# Patient Record
Sex: MALE | Race: WHITE | Employment: FULL TIME | URBAN - METROPOLITAN AREA
[De-identification: names, ages, dates, MRNs, and addresses within clinical notes are randomized per-mention and may not be internally consistent; named-entity substitution may affect disease eponyms.]

---

## 2021-05-05 ENCOUNTER — HOSPITAL ENCOUNTER (OUTPATIENT)
Dept: GENERAL RADIOLOGY | Age: 56
Discharge: HOME | End: 2021-05-05
Attending: FAMILY MEDICINE
Payer: COMMERCIAL

## 2021-05-05 ENCOUNTER — OFFICE VISIT (OUTPATIENT)
Dept: FAMILY MEDICINE CLINIC | Facility: CLINIC | Age: 56
End: 2021-05-05

## 2021-05-05 VITALS
HEIGHT: 68 IN | BODY MASS INDEX: 25.61 KG/M2 | SYSTOLIC BLOOD PRESSURE: 122 MMHG | WEIGHT: 169 LBS | HEART RATE: 63 BPM | RESPIRATION RATE: 16 BRPM | DIASTOLIC BLOOD PRESSURE: 75 MMHG | TEMPERATURE: 98 F

## 2021-05-05 DIAGNOSIS — G89.29 TOE PAIN, CHRONIC, RIGHT: ICD-10-CM

## 2021-05-05 DIAGNOSIS — M79.674 TOE PAIN, CHRONIC, RIGHT: ICD-10-CM

## 2021-05-05 DIAGNOSIS — M25.474 SWELLING OF FIRST METATARSOPHALANGEAL (MTP) JOINT OF RIGHT FOOT: Primary | ICD-10-CM

## 2021-05-05 DIAGNOSIS — M25.474 SWELLING OF FIRST METATARSOPHALANGEAL (MTP) JOINT OF RIGHT FOOT: ICD-10-CM

## 2021-05-05 PROCEDURE — 73630 X-RAY EXAM OF FOOT: CPT | Performed by: FAMILY MEDICINE

## 2021-05-05 PROCEDURE — 3074F SYST BP LT 130 MM HG: CPT | Performed by: FAMILY MEDICINE

## 2021-05-05 PROCEDURE — 3008F BODY MASS INDEX DOCD: CPT | Performed by: FAMILY MEDICINE

## 2021-05-05 PROCEDURE — 3078F DIAST BP <80 MM HG: CPT | Performed by: FAMILY MEDICINE

## 2021-05-05 PROCEDURE — 99213 OFFICE O/P EST LOW 20 MIN: CPT | Performed by: FAMILY MEDICINE

## 2021-05-05 NOTE — PROGRESS NOTES
HPI:  60 yr old male who presents new to clinic. Wife comes to clinic. Used to see Dr. Zaria Alonso out of [facility]. . Pt reports pain in the right great toe. Has been hurting for the past 9 months.      PMHx: reviewed, see chart  PSHx: reviewed, see chart

## 2021-05-18 ENCOUNTER — OFFICE VISIT (OUTPATIENT)
Dept: PODIATRY CLINIC | Facility: CLINIC | Age: 56
End: 2021-05-18

## 2021-05-18 DIAGNOSIS — M20.21 HALLUX RIGIDUS OF RIGHT FOOT: Primary | ICD-10-CM

## 2021-05-18 PROCEDURE — 99243 OFF/OP CNSLTJ NEW/EST LOW 30: CPT | Performed by: PODIATRIST

## 2021-05-18 RX ORDER — IBUPROFEN 600 MG/1
600 TABLET ORAL EVERY 6 HOURS PRN
COMMUNITY
Start: 2021-04-20 | End: 2021-12-08 | Stop reason: WASHOUT

## 2021-05-18 NOTE — PROGRESS NOTES
HPI:    Patient ID: Ivonne Toussaint is a 60year old male. This 49-year-old male presents as a new patient to me on consult from [address] [facility]. Patient's chief complaint is pain and swelling of the right great toe.   Is been a problem for at least 9 months and i questions I believe that he is well-informed and indicates an understanding of my instructions. I welcomed further question and will follow-up when he decides on surgery.          ASSESSMENT/PLAN:   Hallux rigidus of right foot  (primary encounter diagnosi

## 2021-05-19 ENCOUNTER — TELEPHONE (OUTPATIENT)
Dept: FAMILY MEDICINE CLINIC | Facility: CLINIC | Age: 56
End: 2021-05-19

## 2021-05-19 DIAGNOSIS — M79.674 GREAT TOE PAIN, RIGHT: Primary | ICD-10-CM

## 2021-05-19 NOTE — TELEPHONE ENCOUNTER
Patient calling and states he send Dr. Faviola Moon yesterday and he don't agree with some of his diagnosis. He will like a second opinion and another Doctor to see and he is also asking can someone call him to discuss this .

## 2021-05-19 NOTE — TELEPHONE ENCOUNTER
Pt was called regarding results and he states he already saw Dr. Mary Duran 5/18/21, however, would like a 2nd opinion. Pt would like to speak with Dr. Guadalupe Szymanski regarding his visit with Dr. Mary Duran.

## 2021-05-20 NOTE — TELEPHONE ENCOUNTER
Managed Care-      Patient called and had questions about surgery. He would like a second opinion. He needs to see another podiatrist or a surgeon. Can you tell me where else he can go?      Can he use the new Podiatrist?     Thanks

## 2021-05-21 NOTE — TELEPHONE ENCOUNTER
Ghulam Mullins,    These are the podiatrists that are in [facility]' network.     Thank you  Alexa Washington

## 2021-06-15 ENCOUNTER — OFFICE VISIT (OUTPATIENT)
Dept: PODIATRY CLINIC | Facility: CLINIC | Age: 56
End: 2021-06-15

## 2021-06-15 DIAGNOSIS — M79.671 RIGHT FOOT PAIN: ICD-10-CM

## 2021-06-15 DIAGNOSIS — M19.071 ARTHRITIS OF RIGHT FOOT: ICD-10-CM

## 2021-06-15 DIAGNOSIS — M20.21 ACQUIRED HALLUX RIGIDUS OF RIGHT FOOT: Primary | ICD-10-CM

## 2021-06-15 PROCEDURE — 99214 OFFICE O/P EST MOD 30 MIN: CPT | Performed by: PODIATRIST

## 2021-06-15 NOTE — PROGRESS NOTES
[facility], [facility] Podiatry  Progress Note    Rochelle Gregory is a 60year old male.  Patient presents with:  Consult: R great toe, pain for over 6 months, xray in EPIC, 3/10 pain scale        HPI:     This is a pleasant male that presents to clinic today due to changes. No open lesions. No macerations, No Hyperkeratotic lesions. Nails are of normal thickness and appearance.   Vascular examination:   DP pulse is 2/4  PT pulse is 2/4  Capillary refill is immediate  Neurological examination:   Vibratory (128-Hz tunin orthotics    If pt elects for surgery recommend 1st MPJ fusion due to severity of the arthritis    RTC PRN to further discuss surgery. No follow-ups on file.     Sanjuanita Velasco DPM  6/15/2021

## 2021-06-22 ENCOUNTER — TELEPHONE (OUTPATIENT)
Dept: PODIATRY CLINIC | Facility: CLINIC | Age: 56
End: 2021-06-22

## 2021-06-22 NOTE — TELEPHONE ENCOUNTER
Patient has seen both Dr. Mary Duran and Dr. Vanessa Eng for surgical consultation. Neither has sent me a scheduling request.  Please advise.

## 2021-06-23 NOTE — TELEPHONE ENCOUNTER
LMTCB on pt's home # (only # listed) to call back to schedule appt with Meshulam for surgical consult appt. -- When pt calls back, please schedule appt with Meshulam. Thank you.

## 2021-06-30 ENCOUNTER — OFFICE VISIT (OUTPATIENT)
Dept: PODIATRY CLINIC | Facility: CLINIC | Age: 56
End: 2021-06-30

## 2021-06-30 ENCOUNTER — TELEPHONE (OUTPATIENT)
Dept: PODIATRY CLINIC | Facility: CLINIC | Age: 56
End: 2021-06-30

## 2021-06-30 DIAGNOSIS — M20.21 HALLUX RIGIDUS OF RIGHT FOOT: Primary | ICD-10-CM

## 2021-06-30 DIAGNOSIS — M20.21 ACQUIRED HALLUX RIGIDUS OF RIGHT FOOT: Primary | ICD-10-CM

## 2021-06-30 DIAGNOSIS — M79.671 RIGHT FOOT PAIN: ICD-10-CM

## 2021-06-30 PROCEDURE — 99214 OFFICE O/P EST MOD 30 MIN: CPT | Performed by: PODIATRIST

## 2021-06-30 NOTE — PROGRESS NOTES
[facility], [facility] Podiatry  Progress Note    Jamel Douglas is a 60year old male. Patient presents with: Follow - Up: Woud like to discuss surgical options. Denies currently any pain.          HPI:     This is a pleasant male that presents to clinic today due examination:   There are no varicosities. Skin appears moist, warm, and supple with positive hair growth. There are no color changes. No open lesions. No macerations, No Hyperkeratotic lesions. Nails are of normal thickness and appearance.   Vascular ex surgical procedures. The patient has failed all conservative treatment at this point.  The patient understands that if surgical procedures are performed, there are risks and complications that could occur, including but not limited to: hematoma formation, d course of treatment were answered to their understanding and satisfaction. RTO and follow up after surgery is necessary and expected and agreed to by the patient.  The patient acknowledged understanding of the above and agrees to follow all instructions and

## 2021-06-30 NOTE — TELEPHONE ENCOUNTER
Procedure: Right foot cheilectomy 1st MPJ  CPT code:   Length of Surgery: 60min  Any Instruments: power sagittal saw  Call patient: within 24 hours  Anesthesia: MAC  Location: [facility]  Assistance: none  Pacemaker: No  Anticoagulants: No  Nickel Allergy: No  L

## 2021-07-01 NOTE — TELEPHONE ENCOUNTER
Patient called back and requested surgery be scheduled for 8/5/21 which was done so this date at [facility]. Managed care order placed this date. Patient was told I would call him next week to review the details and schedule post op visit.

## 2021-07-06 ENCOUNTER — TELEPHONE (OUTPATIENT)
Dept: FAMILY MEDICINE CLINIC | Facility: CLINIC | Age: 56
End: 2021-07-06

## 2021-07-06 NOTE — TELEPHONE ENCOUNTER
Pt informed medical records received, but there is no Colonoscopy report. Pt stts he has the report and will fax it.

## 2021-07-12 NOTE — TELEPHONE ENCOUNTER
Called patient this date and let him know surgery is confirmed for 8/5/21 at 9:00 a.m. Reviewed pre-op instructions with patient and he voiced understanding. Also sent instructions to him thru My Chart this date. Scheduled first post op appt as well.   KIKE

## 2021-07-27 ENCOUNTER — TELEPHONE (OUTPATIENT)
Dept: PODIATRY CLINIC | Facility: CLINIC | Age: 56
End: 2021-07-27

## 2021-07-27 NOTE — TELEPHONE ENCOUNTER
[facility] faxed forms to [facility]  for Dr Lily Haney to complete.   Scanned to Forms, took orig to [place]

## 2021-08-03 NOTE — TELEPHONE ENCOUNTER
Pt is seeking continuous leave. 1st day off work 8/5/21 (sx) he is having Cheilectomy with dr Nemesio Vann of 10-15 business day turn around time.

## 2021-08-04 NOTE — TELEPHONE ENCOUNTER
Dr. Brandie Krishnan,     Please sign off on form: Fmla   -Highlight the patient and hit \"Chart\" button.   -In Chart Review, w/in the Encounter tab - click 1 time on the Telephone call encounter for 7/27/21 Scroll down the telephone encounter.  -Click \"scan on\"

## 2021-08-05 ENCOUNTER — PROCEDURE (OUTPATIENT)
Dept: SURGERY | Age: 56
End: 2021-08-05

## 2021-08-05 PROCEDURE — 28289 CORRJ HALUX RIGDUS W/O IMPLT: CPT | Performed by: PODIATRIST

## 2021-08-05 RX ORDER — HYDROCODONE BITARTRATE AND ACETAMINOPHEN 5; 325 MG/1; MG/1
1 TABLET ORAL EVERY 6 HOURS PRN
Qty: 20 TABLET | Refills: 0 | Status: SHIPPED | OUTPATIENT
Start: 2021-08-05 | End: 2021-08-10

## 2021-08-05 NOTE — PROCEDURES
OPERATIVE REPORT     Wilmer Maldonado Patient Status:  No patient class for patient encounter    1965 MRN [id number]   Location [address] [facility] Attending No att. providers found   Hosp Day # 0 PCP Georgette Matthews DO     Date of Yadav right ankle. Following IV sedation, local anesthesia was obtained about the right foot utilizing 20 cc's of a 1:1 mixture of 1% lidocaine plain and 0.5% marcaine plain. The foot was then scrubbed, prepped, and draped in the usual aseptic manner.   The pneu subsequently evaluated which did reveal adequate resection of the previous hypertrophic osseous proliferations.   Range of motion was assessed at this time and was noted to be improved along the first metatarsal phalangeal joint however there was still sign

## 2021-08-05 NOTE — TELEPHONE ENCOUNTER
Forms completed and faxed to [address] [place] [address] at [phone]. Sent [facility].

## 2021-08-05 NOTE — PROGRESS NOTES
OPERATIVE REPORT     Beata Fairly Patient Status:  No patient class for patient encounter    1965 MRN [id number]   Location [address] [facility] Attending No att. providers found   Hosp Day # 0 PCP Vineet Montano DO     Date of Yadav patient's right ankle. Following IV sedation, local anesthesia was obtained about the right foot utilizing 20 cc's of a 1:1 mixture of 1% lidocaine plain and 0.5% marcaine plain. The foot was then scrubbed, prepped, and draped in the usual aseptic manner. was subsequently evaluated which did reveal adequate resection of the previous hypertrophic osseous proliferations.   Range of motion was assessed at this time and was noted to be improved along the first metatarsal phalangeal joint however there was still

## 2021-08-06 ENCOUNTER — TELEPHONE (OUTPATIENT)
Dept: PODIATRY CLINIC | Facility: CLINIC | Age: 56
End: 2021-08-06

## 2021-08-11 ENCOUNTER — OFFICE VISIT (OUTPATIENT)
Dept: PODIATRY CLINIC | Facility: CLINIC | Age: 56
End: 2021-08-11

## 2021-08-11 DIAGNOSIS — Z98.890 POST-OPERATIVE STATE: Primary | ICD-10-CM

## 2021-08-11 PROCEDURE — 99024 POSTOP FOLLOW-UP VISIT: CPT | Performed by: PODIATRIST

## 2021-08-11 RX ORDER — IBUPROFEN 600 MG/1
600 TABLET ORAL EVERY 8 HOURS PRN
Qty: 60 TABLET | Refills: 0 | Status: SHIPPED | OUTPATIENT
Start: 2021-08-11 | End: 2021-08-25

## 2021-08-11 NOTE — PROGRESS NOTES
[facility], [facility] Podiatry  Progress Note    Mavis Guardado is a 60year old male.  Patient presents with:  Post-Op: 1st post op, sx 8/5, pain on day one but improving since, pain today 3/10 no meds, denies s/s of infection        HPI:     This is a pleasant ma time.  Integumentary examination:   There are no varicosities. Skin appears moist, warm, and supple with positive hair growth. Right dorsal medial 1st MPJ incision site is well coapted with sutures intact, no SOI.   Vascular examination:   DP pulse is

## 2021-08-18 ENCOUNTER — OFFICE VISIT (OUTPATIENT)
Dept: PODIATRY CLINIC | Facility: CLINIC | Age: 56
End: 2021-08-18

## 2021-08-18 ENCOUNTER — HOSPITAL ENCOUNTER (OUTPATIENT)
Dept: GENERAL RADIOLOGY | Age: 56
Discharge: HOME | End: 2021-08-18
Attending: PODIATRIST
Payer: COMMERCIAL

## 2021-08-18 VITALS — WEIGHT: 169 LBS | BODY MASS INDEX: 25.61 KG/M2 | HEIGHT: 68 IN

## 2021-08-18 DIAGNOSIS — Z98.890 POST-OPERATIVE STATE: ICD-10-CM

## 2021-08-18 DIAGNOSIS — Z98.890 POST-OPERATIVE STATE: Primary | ICD-10-CM

## 2021-08-18 PROCEDURE — 3008F BODY MASS INDEX DOCD: CPT | Performed by: PODIATRIST

## 2021-08-18 PROCEDURE — 99024 POSTOP FOLLOW-UP VISIT: CPT | Performed by: PODIATRIST

## 2021-08-18 PROCEDURE — 73630 X-RAY EXAM OF FOOT: CPT | Performed by: PODIATRIST

## 2021-08-18 NOTE — PROGRESS NOTES
[facility], [facility] Podiatry  Progress Note    Dilia Baxter is a 60year old male.  Patient presents with:  Post-Op: right foot suture removals, denies any pain,         HPI:     This is a pleasant male that presents to clinic today 2 week S/P right 1st MPJ braxton Constitutional:   Patient in no apparent distress. Well kept Of normal body habitus. Alert and oriented to person, place, and time. Integumentary examination:   There are no varicosities.    Skin appears moist, warm, and supple with positive hair growt

## 2021-08-25 ENCOUNTER — OFFICE VISIT (OUTPATIENT)
Dept: PODIATRY CLINIC | Facility: CLINIC | Age: 56
End: 2021-08-25

## 2021-08-25 DIAGNOSIS — M20.21 HALLUX RIGIDUS OF RIGHT FOOT: ICD-10-CM

## 2021-08-25 DIAGNOSIS — Z98.890 POST-OPERATIVE STATE: Primary | ICD-10-CM

## 2021-08-25 PROCEDURE — 99024 POSTOP FOLLOW-UP VISIT: CPT | Performed by: PODIATRIST

## 2021-08-26 ENCOUNTER — TELEPHONE (OUTPATIENT)
Dept: FAMILY MEDICINE CLINIC | Facility: CLINIC | Age: 56
End: 2021-08-26

## 2021-08-26 ENCOUNTER — TELEMEDICINE (OUTPATIENT)
Dept: FAMILY MEDICINE CLINIC | Facility: CLINIC | Age: 56
End: 2021-08-26

## 2021-08-26 DIAGNOSIS — Z12.11 SCREENING FOR COLON CANCER: ICD-10-CM

## 2021-08-26 DIAGNOSIS — J30.2 SEASONAL ALLERGIC RHINITIS, UNSPECIFIED TRIGGER: Primary | ICD-10-CM

## 2021-08-26 PROCEDURE — 99213 OFFICE O/P EST LOW 20 MIN: CPT | Performed by: FAMILY MEDICINE

## 2021-08-26 NOTE — PROGRESS NOTES
HPI: Mark Petty is a 60year old male who presents for sinus congestion. Pt has been taking Flonase. No fever or other COVID symptoms. No change in taste or smell. Has been going on for 3-4 weeks. Has post nasal drip. Worse in the morning. Denies HA.   Has s

## 2021-08-26 NOTE — TELEPHONE ENCOUNTER
Pt calling stating he was waiting in Telehealth lobby but never connected with Dr. Jeremiah Borges asking if she will still be speaking with him today. Requesting call back   Please advise.

## 2021-08-27 ENCOUNTER — PATIENT MESSAGE (OUTPATIENT)
Dept: FAMILY MEDICINE CLINIC | Facility: CLINIC | Age: 56
End: 2021-08-27

## 2021-08-27 ENCOUNTER — TELEPHONE (OUTPATIENT)
Dept: FAMILY MEDICINE CLINIC | Facility: CLINIC | Age: 56
End: 2021-08-27

## 2021-08-27 RX ORDER — CLINDAMYCIN HYDROCHLORIDE 300 MG/1
300 CAPSULE ORAL 3 TIMES DAILY
Qty: 21 CAPSULE | Refills: 0 | Status: SHIPPED | OUTPATIENT
Start: 2021-08-27 | End: 2021-09-03

## 2021-08-27 NOTE — TELEPHONE ENCOUNTER
From: Dianna Thompson  To: Jacob Novoa DO  Sent: 8/27/2021 10:30 AM CDT  Subject: Prescription Question    Good morning Dr. Orosco Presume I am reaching out to you this morning in hopes that what I’m asking for is a prescription for sinus infection I feel that wh

## 2021-08-27 NOTE — TELEPHONE ENCOUNTER
The patient stated the Zyrtec did not help and feels what he has is a sinus infection and is asking for an antibiotic. The patient stated his symptoms started 1 1/2 months ago. Denies a fever.   He also stated every morning he sneezes for 45 minutes a

## 2021-09-01 ENCOUNTER — OFFICE VISIT (OUTPATIENT)
Dept: PODIATRY CLINIC | Facility: CLINIC | Age: 56
End: 2021-09-01

## 2021-09-01 DIAGNOSIS — Z98.890 POST-OPERATIVE STATE: ICD-10-CM

## 2021-09-01 DIAGNOSIS — M20.5X1 HALLUX LIMITUS OF RIGHT FOOT: Primary | ICD-10-CM

## 2021-09-01 PROCEDURE — 99024 POSTOP FOLLOW-UP VISIT: CPT | Performed by: PODIATRIST

## 2021-09-01 NOTE — PROGRESS NOTES
[facility], [facility] Podiatry  Progress Note    Joe Priest is a 60year old male. Patient presents with:  Post-Op: 1 wk f/u s/p right 1st MPJ cheilectomy done on 8/5/21.  per patient foot doing well. states is ready to go back to work if ok per MD. Denies any visit. Constitutional:   Patient in no apparent distress. Well kept Of normal body habitus. Alert and oriented to person, place, and time. Integumentary examination:   There are no varicosities.    Skin appears moist, warm, and supple with positive hair

## 2021-09-03 ENCOUNTER — TELEPHONE (OUTPATIENT)
Dept: PODIATRY CLINIC | Facility: CLINIC | Age: 56
End: 2021-09-03

## 2021-09-07 ENCOUNTER — TELEPHONE (OUTPATIENT)
Dept: PHYSICAL THERAPY | Facility: HOSPITAL | Age: 56
End: 2021-09-07

## 2021-09-08 ENCOUNTER — TELEPHONE (OUTPATIENT)
Dept: PODIATRY CLINIC | Facility: CLINIC | Age: 56
End: 2021-09-08

## 2021-09-08 ENCOUNTER — TELEPHONE (OUTPATIENT)
Dept: PHYSICAL THERAPY | Facility: HOSPITAL | Age: 56
End: 2021-09-08

## 2021-09-08 NOTE — TELEPHONE ENCOUNTER
[facility] disab forms logged. [facility] consent on file. Sent Forms fee [facility] message.

## 2021-09-08 NOTE — TELEPHONE ENCOUNTER
[address] faxed Short Term Disability Forms to  [facility] for Dr Marion Chen to complete. Pt has not signed HIPPA or pd $25 fee for Disability forms. Scanned to [facility] and brought originals to [place] @ [facility].

## 2021-09-09 ENCOUNTER — APPOINTMENT (OUTPATIENT)
Dept: PHYSICAL THERAPY | Facility: HOSPITAL | Age: 56
End: 2021-09-09
Attending: PODIATRIST
Payer: COMMERCIAL

## 2021-09-09 ENCOUNTER — TELEPHONE (OUTPATIENT)
Dept: PHYSICAL THERAPY | Facility: HOSPITAL | Age: 56
End: 2021-09-09

## 2021-09-21 NOTE — TELEPHONE ENCOUNTER
[address] faxed Attending Physician Statement to  [facility]  for Dr Carlos Hernández to complete. Unsure if pt has signed HIPPA or pd forms fee. Scanned to [place] and brought original to [facility].

## 2021-09-21 NOTE — TELEPHONE ENCOUNTER
Dr. Kristina Valentin,     Please sign off on form: Disab   -Highlight the patient and hit \"Chart\" button.   -In Chart Review, w/in the Encounter tab - click 1 time on the Telephone call encounter for 7/27/21 Scroll down the telephone encounter.  -Click \"scan on\

## 2021-09-30 ENCOUNTER — APPOINTMENT (OUTPATIENT)
Dept: PHYSICAL THERAPY | Facility: HOSPITAL | Age: 56
End: 2021-09-30
Attending: FAMILY MEDICINE
Payer: COMMERCIAL

## 2021-09-30 ENCOUNTER — TELEPHONE (OUTPATIENT)
Dept: PHYSICAL THERAPY | Facility: HOSPITAL | Age: 56
End: 2021-09-30

## 2021-10-14 ENCOUNTER — OFFICE VISIT (OUTPATIENT)
Dept: PHYSICAL THERAPY | Facility: HOSPITAL | Age: 56
End: 2021-10-14
Payer: COMMERCIAL

## 2021-10-14 PROCEDURE — 97161 PT EVAL LOW COMPLEX 20 MIN: CPT

## 2021-10-14 PROCEDURE — 97140 MANUAL THERAPY 1/> REGIONS: CPT

## 2021-10-14 NOTE — PROGRESS NOTES
LOWER EXTREMITY EVALUATION:   Referring Physician: Dr. Glenn Villeda  Diagnosis: Hallux limitus of right foot (M20.5X1)  Post-operative state (Q50.631)       Date of Service: 10/14/2021     PATIENT SUMMARY   Emilie Joyce is a 60year old male who presents to the above ipairments and reach functional goals. Precautions:  None other than surgical   OBJECTIVE:   Observation:   Gait: pt ambulates on level ground with decreased push off on R LE.   Stairs: reciprocal   B LE Squat:  Raises heels with restricted gr min     Based on clinical rationale and outcome measures, this evaluation involved Low Complexity decision making. PLAN OF CARE:    Goals: (to be met in 8-10 visits)  Pt will improve R TCJ  DF by  5-10 deg  to promote normalized walking pattern.   Pt wi 10/14/2021  To:1/12/2022

## 2021-10-20 ENCOUNTER — OFFICE VISIT (OUTPATIENT)
Dept: PHYSICAL THERAPY | Facility: HOSPITAL | Age: 56
End: 2021-10-20
Payer: COMMERCIAL

## 2021-10-20 PROCEDURE — 97140 MANUAL THERAPY 1/> REGIONS: CPT

## 2021-10-20 PROCEDURE — 97110 THERAPEUTIC EXERCISES: CPT

## 2021-10-20 NOTE — PROGRESS NOTES
Dx: Hallux limitus of right foot (M20.5X1)  Post-operative state (Z98.890)         Insurance (Authorized # of Visits):  [employer] [facility] 2/12 auth until 12/3/2021           Authorizing Physician: Dr. Valerie Solis  Next MD visit: none scheduled  Fall Risk: standard ADL.  Pt will be indep with HEP for symptom management and recovery of function.     Plan: foot/ankle intrinsic and extrinsic training; mobility of joints through the foot/ankle complex ; lower quarter strength  Patient will be seen for 1-2 x/week or a tota

## 2021-10-25 ENCOUNTER — OFFICE VISIT (OUTPATIENT)
Dept: PHYSICAL THERAPY | Facility: HOSPITAL | Age: 56
End: 2021-10-25
Payer: COMMERCIAL

## 2021-10-25 PROCEDURE — 97140 MANUAL THERAPY 1/> REGIONS: CPT

## 2021-10-25 PROCEDURE — 97110 THERAPEUTIC EXERCISES: CPT

## 2021-10-25 NOTE — PROGRESS NOTES
Dx: Hallux limitus of right foot (M20.5X1)  Post-operative state (Z98.890)         Insurance (Authorized # of Visits):  Mohinder Robbins [address] [facility] 2/12 auth until 12/3/2021           Authorizing Physician: Dr. Cecillia Osgood Next MD visit: none scheduled  Fall Risk: standard lower quarter strengthening. Date: 10/20/2021  TX#: 2/8-10 per poc, 12 auth per HMO Date:  10/25/21              TX#: 3/12 per HMO Date:                 TX#: 4/12 per HMO Date:                 TX#: 5/12 per HMO Date:    Tx#: 6/12 per HMO   MT:   TC gapping

## 2021-10-27 ENCOUNTER — APPOINTMENT (OUTPATIENT)
Dept: PHYSICAL THERAPY | Facility: HOSPITAL | Age: 56
End: 2021-10-27
Payer: COMMERCIAL

## 2021-11-01 ENCOUNTER — OFFICE VISIT (OUTPATIENT)
Dept: PHYSICAL THERAPY | Facility: HOSPITAL | Age: 56
End: 2021-11-01
Payer: COMMERCIAL

## 2021-11-01 PROCEDURE — 97110 THERAPEUTIC EXERCISES: CPT

## 2021-11-01 PROCEDURE — 97140 MANUAL THERAPY 1/> REGIONS: CPT

## 2021-11-01 NOTE — PROGRESS NOTES
Dx: Hallux limitus of right foot (M20.5X1)  Post-operative state (Z98.890)         Insurance (Authorized # of Visits):  Baron Peto [facility] 2/12 auth until 12/3/2021           Authorizing Physician: Dr. Mitul Chao  Next MD visit: none scheduled  Fall Risk: standard quarter strengthening. Date: 10/20/2021  TX#: 2/8-10 per poc, 12 auth per HMO Date:  10/25/21              TX#: 3/12 per HMO Date:    11/1/121            TX#: 4/12 per HMO Date:                 TX#: 5/12 per HMO Date:    Tx#: 6/12 per HMO   MT:   WILLIAM stubbs extension  2x20  -Calf stretching @ wall 20 sec x3;          HEP:  10/20/21: self DF mobs for TCJ and MTP ray 1; calf stretching    Charges: TE x1; MT x2;        Total Timed Treatment: 45 min  Total Treatment Time: 46 min

## 2021-11-03 ENCOUNTER — APPOINTMENT (OUTPATIENT)
Dept: PHYSICAL THERAPY | Facility: HOSPITAL | Age: 56
End: 2021-11-03
Payer: COMMERCIAL

## 2021-11-08 ENCOUNTER — APPOINTMENT (OUTPATIENT)
Dept: PHYSICAL THERAPY | Facility: HOSPITAL | Age: 56
End: 2021-11-08
Payer: COMMERCIAL

## 2021-11-10 ENCOUNTER — OFFICE VISIT (OUTPATIENT)
Dept: PODIATRY CLINIC | Facility: CLINIC | Age: 56
End: 2021-11-10

## 2021-11-10 ENCOUNTER — APPOINTMENT (OUTPATIENT)
Dept: PHYSICAL THERAPY | Facility: HOSPITAL | Age: 56
End: 2021-11-10
Payer: COMMERCIAL

## 2021-11-10 VITALS — BODY MASS INDEX: 25.61 KG/M2 | HEIGHT: 68 IN | WEIGHT: 169 LBS

## 2021-11-10 DIAGNOSIS — M20.21 HALLUX RIGIDUS OF RIGHT FOOT: Primary | ICD-10-CM

## 2021-11-10 PROCEDURE — 99212 OFFICE O/P EST SF 10 MIN: CPT | Performed by: PODIATRIST

## 2021-11-10 PROCEDURE — 3008F BODY MASS INDEX DOCD: CPT | Performed by: PODIATRIST

## 2021-11-10 NOTE — PROGRESS NOTES
[facility], [facility] Podiatry  Progress Note    Supa Mckee is a 60year old male.  Patient presents with:  Post-Op:  SX on 8/5/21 chielectomy  right 1st MPJ ,patient states he has some sorness a 4/10         HPI:     This is a pleasant male that presents to  medial 1st MPJ incision site is well healed  Vascular examination:   DP pulse is 2/4  PT pulse is 2/4  Capillary refill is immediate    Mild edema to right forefoot  Neurological examination:   Vibratory (128-Hz tuning fork) sensation is present to right a

## 2021-11-11 ENCOUNTER — TELEPHONE (OUTPATIENT)
Dept: GASTROENTEROLOGY | Facility: CLINIC | Age: 56
End: 2021-11-11

## 2021-11-11 ENCOUNTER — OFFICE VISIT (OUTPATIENT)
Dept: GASTROENTEROLOGY | Facility: CLINIC | Age: 56
End: 2021-11-11

## 2021-11-11 VITALS
HEIGHT: 68 IN | WEIGHT: 178 LBS | HEART RATE: 65 BPM | SYSTOLIC BLOOD PRESSURE: 126 MMHG | BODY MASS INDEX: 26.98 KG/M2 | DIASTOLIC BLOOD PRESSURE: 70 MMHG

## 2021-11-11 DIAGNOSIS — Z12.11 SCREENING FOR COLON CANCER: ICD-10-CM

## 2021-11-11 DIAGNOSIS — Z12.11 COLON CANCER SCREENING: Primary | ICD-10-CM

## 2021-11-11 PROCEDURE — 3078F DIAST BP <80 MM HG: CPT | Performed by: INTERNAL MEDICINE

## 2021-11-11 PROCEDURE — 3074F SYST BP LT 130 MM HG: CPT | Performed by: INTERNAL MEDICINE

## 2021-11-11 PROCEDURE — 3008F BODY MASS INDEX DOCD: CPT | Performed by: INTERNAL MEDICINE

## 2021-11-11 PROCEDURE — 99243 OFF/OP CNSLTJ NEW/EST LOW 30: CPT | Performed by: INTERNAL MEDICINE

## 2021-11-11 RX ORDER — POLYETHYLENE GLYCOL 3350, SODIUM CHLORIDE, SODIUM BICARBONATE, POTASSIUM CHLORIDE 420; 11.2; 5.72; 1.48 G/4L; G/4L; G/4L; G/4L
POWDER, FOR SOLUTION ORAL
Qty: 1 EACH | Refills: 0 | Status: SHIPPED | OUTPATIENT
Start: 2021-11-11

## 2021-11-11 NOTE — H&P
[facility] [address] Gastroenterology                                                                                                  Clinic History and Physical     Pa Take by mouth. • ibuprofen 600 MG Oral Tab Take 600 mg by mouth every 6 (six) hours as needed.    (Patient not taking: No sig reported)         Allergies:    Penicillamine           HIVES  Penicillins             RASH    ROS:   CONSTITUTIONAL:  negative to proceed with colonoscopy. Plan:  1. Schedule colonoscopy with MAC [Diagnosis: screening]    2.  bowel prep from pharmacy (split GOlytely)    3. Continue all medications for procedure    4. Read all bowel prep instructions carefully    5.  AVOID

## 2021-11-11 NOTE — TELEPHONE ENCOUNTER
CC ck up meds refill    HPI 60 y/o male known gouty arthritis, hypertension, hyperlipidemia,& ED who  Is 3 month fu    Gouty arthritis mostly in ankles & feet & knees  Last attack was about 2 weeks ago    Hypertension  No chest pain, headache, sob, leg edema, stroke, kidney disease     Hyperlipidemia  No muscle aches or muscle weakness or cramps since last visit. ED  Able to obtain/maintain erection  Current treatment        Lab Results   Component Value Date    CHOL 170 06/28/2019    CHOL 264 (H) 04/02/2018    CHOL 242 (H) 01/27/2017     Lab Results   Component Value Date    TRIG 113 06/28/2019    TRIG 174 (H) 04/02/2018    TRIG 104 01/27/2017     Lab Results   Component Value Date    HDL 71 (H) 06/28/2019    HDL 68 (H) 04/02/2018    HDL 95 (H) 01/27/2017     Lab Results   Component Value Date    LDLCALC 76 06/28/2019    LDLCALC 161 (H) 04/02/2018    LDLCALC 126 (H) 01/27/2017     Lab Results   Component Value Date    LABVLDL 23 06/28/2019    LABVLDL 35 04/02/2018    LABVLDL 21 01/27/2017     No results found for: CHOLHDLRATIO      1. Chronic idiopathic gout involving toe, unspecified laterality  Last attack 2 weeks ago, ck Uric acid level  - allopurinol (ZYLOPRIM) 300 MG tablet; Take 1 tablet by mouth daily  Dispense: 90 tablet; Refill: 1  - colchicine (COLCRYS) 0.6 MG tablet; Take 1 tablet by mouth once daily  Dispense: 90 tablet; Refill: 0  - indomethacin (INDOCIN) 50 MG capsule; Take 1 capsule by mouth 2 times daily (with meals) As needed stop ibuprofen  Dispense: 180 capsule; Refill: 1    2. Essential hypertension  bp at goal  meds refill  Low salt diet  Labs  Patient is compliant with medications, denies side effects,provides adequate relief. No new symptoms or problem noted by patient today. Patient's problem is noted, no change  Since last visit. Will monitor labs as needed. Will refill as appropriate .  Patient has been counseled today and will continue current plans.    -
Scheduled for:  Colonoscopy 51003  Provider Name: Dr Todd Jaimes   Date:  Mon 1/10/2022  Location: [facility]   Sedation: MAC   Time: 11:15 am    Prep: golytely    Meds/Allergies Reconciled?: physician review  Diagnosis with codes: screening Z12.11   Was patient inform
bisoprolol-hydroCHLOROthiazide (ZIAC) 10-6.25 MG per tablet; TAKE ONE TABLET BY MOUTH TWICE A DAY  Dispense: 180 tablet; Refill: 1  - Lipid, Fasting; Future  - Basic Metabolic Panel; Future  - AST; Future    3. Pure hypercholesterolemia  No recent testing  On statin    Ck labs  Low fat diet  Exercise       - atorvastatin (LIPITOR) 80 MG tablet; TAKE 1 TABLET BY MOUTH ONE TIME A DAY  Dispense: 90 tablet; Refill: 1  - Lipid, Fasting; Future  - AST; Future    4. Erectile dysfunction, unspecified erectile dysfunction type  Helps with erection    Cont. - tadalafil (CIALIS) 5 MG tablet; Take 1 tablet by mouth daily  Dispense: 90 tablet; Refill: 1    5. Screen for colon cancer    - POCT Fecal Immunochemical Test (FIT); Future    6. Prostate cancer screening    - PSA screening;  Future
Face Mask

## 2021-11-11 NOTE — PATIENT INSTRUCTIONS
1. Schedule colonoscopy with MAC [Diagnosis: screening]    2.  bowel prep from pharmacy (split GOlytely)    3. Continue all medications for procedure    4. Read all bowel prep instructions carefully    5.  AVOID seeds, nuts, popcorn, raw fruits and v

## 2021-11-15 ENCOUNTER — APPOINTMENT (OUTPATIENT)
Dept: PHYSICAL THERAPY | Facility: HOSPITAL | Age: 56
End: 2021-11-15
Payer: COMMERCIAL

## 2021-11-17 ENCOUNTER — APPOINTMENT (OUTPATIENT)
Dept: PHYSICAL THERAPY | Facility: HOSPITAL | Age: 56
End: 2021-11-17
Payer: COMMERCIAL

## 2021-12-08 ENCOUNTER — OFFICE VISIT (OUTPATIENT)
Dept: PODIATRY CLINIC | Facility: CLINIC | Age: 56
End: 2021-12-08

## 2021-12-08 DIAGNOSIS — M19.071 ARTHRITIS OF RIGHT FOOT: ICD-10-CM

## 2021-12-08 DIAGNOSIS — M20.21 HALLUX RIGIDUS OF RIGHT FOOT: Primary | ICD-10-CM

## 2021-12-08 PROCEDURE — 99212 OFFICE O/P EST SF 10 MIN: CPT | Performed by: PODIATRIST

## 2021-12-08 RX ORDER — MELOXICAM 15 MG/1
15 TABLET ORAL DAILY
Qty: 30 TABLET | Refills: 1 | Status: SHIPPED | OUTPATIENT
Start: 2021-12-08 | End: 2022-02-06

## 2021-12-08 NOTE — PROGRESS NOTES
[facility], [facility] Podiatry  Progress Note    Dilia Baxter is a 60year old male.  Patient presents with:  Post-Op: soreness in the ball of the right foot ,denies pain at incision site         HPI:     This is a pleasant male that presents to clinic today S/P appears moist, warm, and supple with positive hair growth.      Right dorsal medial 1st MPJ incision site is well healed  Vascular examination:   DP pulse is 2/4  PT pulse is 2/4  Capillary refill is immediate    Mild edema to right forefoot  Neurological e

## 2022-01-07 ENCOUNTER — LAB REQUISITION (OUTPATIENT)
Dept: SURGERY | Age: 57
End: 2022-01-07
Payer: COMMERCIAL

## 2022-01-07 DIAGNOSIS — Z01.818 PREOP EXAMINATION: ICD-10-CM

## 2022-01-09 LAB — SARS-COV-2 RNA RESP QL NAA+PROBE: NOT DETECTED

## 2022-01-10 ENCOUNTER — LAB REQUISITION (OUTPATIENT)
Dept: SURGERY | Age: 57
End: 2022-01-10
Payer: COMMERCIAL

## 2022-01-10 ENCOUNTER — SURGERY CENTER DOCUMENTATION (OUTPATIENT)
Dept: SURGERY | Age: 57
End: 2022-01-10

## 2022-01-10 ENCOUNTER — TELEPHONE (OUTPATIENT)
Dept: SURGERY | Age: 57
End: 2022-01-10

## 2022-01-10 DIAGNOSIS — K63.5 POLYP OF DESCENDING COLON, UNSPECIFIED TYPE: ICD-10-CM

## 2022-01-10 DIAGNOSIS — Z12.11 SPECIAL SCREENING FOR MALIGNANT NEOPLASMS, COLON: ICD-10-CM

## 2022-01-10 PROCEDURE — 88305 TISSUE EXAM BY PATHOLOGIST: CPT | Performed by: INTERNAL MEDICINE

## 2022-01-10 PROCEDURE — 45385 COLONOSCOPY W/LESION REMOVAL: CPT | Performed by: INTERNAL MEDICINE

## 2022-01-10 NOTE — PROCEDURES
COLONOSCOPY REPORT    Nicole Horton     1965 Age 60year old   PCP Nina Doty DO Endoscopist Daniel Lyon MD     Date of procedure: 01/10/22    Location: [facility]    Procedure: Colonoscopy w/cold snar appeared normal.    4. A retroflexed view of the rectum revealed small internal hemorrhoids. 5. The colonic mucosa throughout the colon showed normal vascular pattern, without evidence of angioectasias or inflammation.      6. ROBERT: normal rectal tone, no

## 2022-01-10 NOTE — TELEPHONE ENCOUNTER
Patient left before I could talk to him about c-scope results. LEft detailed message for him re: findings. Asked him to call me if questions.

## 2022-01-11 ENCOUNTER — TELEPHONE (OUTPATIENT)
Dept: GASTROENTEROLOGY | Facility: CLINIC | Age: 57
End: 2022-01-11

## 2022-01-11 NOTE — TELEPHONE ENCOUNTER
Colby Feliciano to know that he did get the drs voicemail message. Pt. Wants the dr to know that everything went well an the staff was excellent to him.

## 2022-01-11 NOTE — TELEPHONE ENCOUNTER
Health maintenance updated.  Last colonoscopy done 1/10/22. 10 year recall placed into Pt Outreach, next due on 1/10/32 per Dr. Antonette Mata.    (recall placed in alternate TE 1/11/22)

## 2022-01-11 NOTE — TELEPHONE ENCOUNTER
Left message for patient as he did not , CLN polyp is hyperplastic polyp, repeat CLN in 10 years.     GI staff: please place recall in for colonoscopy in 10 years

## 2022-01-19 ENCOUNTER — TELEPHONE (OUTPATIENT)
Dept: FAMILY MEDICINE CLINIC | Facility: CLINIC | Age: 57
End: 2022-01-19

## 2022-01-19 ENCOUNTER — OFFICE VISIT (OUTPATIENT)
Dept: PODIATRY CLINIC | Facility: CLINIC | Age: 57
End: 2022-01-19
Payer: COMMERCIAL

## 2022-01-19 DIAGNOSIS — M20.21 HALLUX RIGIDUS OF RIGHT FOOT: Primary | ICD-10-CM

## 2022-01-19 DIAGNOSIS — M19.071 ARTHRITIS OF RIGHT FOOT: ICD-10-CM

## 2022-01-19 PROCEDURE — 99212 OFFICE O/P EST SF 10 MIN: CPT | Performed by: PODIATRIST

## 2022-01-19 NOTE — PROGRESS NOTES
[facility], [facility] Podiatry  Progress Note    Lenin Durant is a 60year old male. Patient presents with:   Toe Pain: s/p R big toe from 8/5/21 f/u - states he is ok, still has achiness in his toe         HPI:     This is a pleasant male that presents to clinic person, place, and time. Integumentary examination:   There are no varicosities. Skin appears moist, warm, and supple with positive hair growth.      Right dorsal medial 1st MPJ incision site is well healed  Vascular examination:   DP pulse is 2/4  PT pu

## 2022-03-29 ENCOUNTER — NURSE TRIAGE (OUTPATIENT)
Dept: FAMILY MEDICINE CLINIC | Facility: CLINIC | Age: 57
End: 2022-03-29

## 2022-04-11 ENCOUNTER — OFFICE VISIT (OUTPATIENT)
Dept: FAMILY MEDICINE CLINIC | Facility: CLINIC | Age: 57
End: 2022-04-11
Payer: COMMERCIAL

## 2022-04-11 VITALS
HEART RATE: 68 BPM | DIASTOLIC BLOOD PRESSURE: 78 MMHG | BODY MASS INDEX: 28.09 KG/M2 | WEIGHT: 179 LBS | OXYGEN SATURATION: 98 % | SYSTOLIC BLOOD PRESSURE: 121 MMHG | RESPIRATION RATE: 19 BRPM | HEIGHT: 67 IN

## 2022-04-11 DIAGNOSIS — H53.8 BLURRY VISION: Primary | ICD-10-CM

## 2022-04-11 DIAGNOSIS — R68.89 SENSITIVITY TO LIGHT: ICD-10-CM

## 2022-04-11 PROCEDURE — 3074F SYST BP LT 130 MM HG: CPT | Performed by: FAMILY MEDICINE

## 2022-04-11 PROCEDURE — 3078F DIAST BP <80 MM HG: CPT | Performed by: FAMILY MEDICINE

## 2022-04-11 PROCEDURE — 99213 OFFICE O/P EST LOW 20 MIN: CPT | Performed by: FAMILY MEDICINE

## 2022-04-11 PROCEDURE — 3008F BODY MASS INDEX DOCD: CPT | Performed by: FAMILY MEDICINE

## 2022-06-01 ENCOUNTER — OFFICE VISIT (OUTPATIENT)
Dept: FAMILY MEDICINE CLINIC | Facility: CLINIC | Age: 57
End: 2022-06-01
Payer: COMMERCIAL

## 2022-06-01 VITALS
DIASTOLIC BLOOD PRESSURE: 73 MMHG | HEART RATE: 53 BPM | WEIGHT: 170 LBS | TEMPERATURE: 99 F | HEIGHT: 67.5 IN | BODY MASS INDEX: 26.37 KG/M2 | SYSTOLIC BLOOD PRESSURE: 134 MMHG

## 2022-06-01 DIAGNOSIS — M79.674 GREAT TOE PAIN, RIGHT: ICD-10-CM

## 2022-06-01 DIAGNOSIS — Z00.00 ROUTINE PHYSICAL EXAMINATION: Primary | ICD-10-CM

## 2022-06-01 DIAGNOSIS — N52.9 ERECTILE DYSFUNCTION, UNSPECIFIED ERECTILE DYSFUNCTION TYPE: ICD-10-CM

## 2022-06-01 LAB
ALBUMIN SERPL-MCNC: 4.1 G/DL (ref 3.4–5)
ALBUMIN/GLOB SERPL: 1.2 {RATIO} (ref 1–2)
ALP LIVER SERPL-CCNC: 66 U/L
ALT SERPL-CCNC: 39 U/L
ANION GAP SERPL CALC-SCNC: 7 MMOL/L (ref 0–18)
AST SERPL-CCNC: 32 U/L (ref 15–37)
BILIRUB SERPL-MCNC: 0.5 MG/DL (ref 0.1–2)
BUN BLD-MCNC: 17 MG/DL (ref 7–18)
BUN/CREAT SERPL: 18.3 (ref 10–20)
CALCIUM BLD-MCNC: 8.8 MG/DL (ref 8.5–10.1)
CHLORIDE SERPL-SCNC: 105 MMOL/L (ref 98–112)
CHOLEST SERPL-MCNC: 178 MG/DL (ref ?–200)
CO2 SERPL-SCNC: 26 MMOL/L (ref 21–32)
COMPLEXED PSA SERPL-MCNC: 0.58 NG/ML (ref ?–4)
CREAT BLD-MCNC: 0.93 MG/DL
FASTING PATIENT LIPID ANSWER: NO
FASTING STATUS PATIENT QL REPORTED: NO
GLOBULIN PLAS-MCNC: 3.5 G/DL (ref 2.8–4.4)
GLUCOSE BLD-MCNC: 88 MG/DL (ref 70–99)
HDLC SERPL-MCNC: 42 MG/DL (ref 40–59)
LDLC SERPL CALC-MCNC: 102 MG/DL (ref ?–100)
NONHDLC SERPL-MCNC: 136 MG/DL (ref ?–130)
OSMOLALITY SERPL CALC.SUM OF ELEC: 287 MOSM/KG (ref 275–295)
POTASSIUM SERPL-SCNC: 3.6 MMOL/L (ref 3.5–5.1)
PROT SERPL-MCNC: 7.6 G/DL (ref 6.4–8.2)
SODIUM SERPL-SCNC: 138 MMOL/L (ref 136–145)
TRIGL SERPL-MCNC: 194 MG/DL (ref 30–149)
TSI SER-ACNC: 2.38 MIU/ML (ref 0.36–3.74)
VLDLC SERPL CALC-MCNC: 33 MG/DL (ref 0–30)

## 2022-06-01 PROCEDURE — 99396 PREV VISIT EST AGE 40-64: CPT | Performed by: FAMILY MEDICINE

## 2022-06-01 PROCEDURE — 90715 TDAP VACCINE 7 YRS/> IM: CPT | Performed by: FAMILY MEDICINE

## 2022-06-01 PROCEDURE — 3075F SYST BP GE 130 - 139MM HG: CPT | Performed by: FAMILY MEDICINE

## 2022-06-01 PROCEDURE — 3008F BODY MASS INDEX DOCD: CPT | Performed by: FAMILY MEDICINE

## 2022-06-01 PROCEDURE — 3078F DIAST BP <80 MM HG: CPT | Performed by: FAMILY MEDICINE

## 2022-06-01 PROCEDURE — 90471 IMMUNIZATION ADMIN: CPT | Performed by: FAMILY MEDICINE

## 2022-06-01 RX ORDER — SILDENAFIL 50 MG/1
50 TABLET, FILM COATED ORAL
Qty: 12 TABLET | Refills: 1 | Status: SHIPPED | OUTPATIENT
Start: 2022-06-01

## 2022-06-01 RX ORDER — MELOXICAM 15 MG/1
15 TABLET ORAL DAILY
COMMUNITY
Start: 2022-04-15

## 2022-07-28 ENCOUNTER — OFFICE VISIT (OUTPATIENT)
Dept: OPHTHALMOLOGY | Facility: CLINIC | Age: 57
End: 2022-07-28
Payer: COMMERCIAL

## 2022-07-28 DIAGNOSIS — H35.81 MACULAR EDEMA: Primary | ICD-10-CM

## 2022-07-28 DIAGNOSIS — H25.13 AGE-RELATED NUCLEAR CATARACT OF BOTH EYES: ICD-10-CM

## 2022-07-28 PROBLEM — H53.19 DISTORTED VISION: Status: ACTIVE | Noted: 2022-07-28

## 2022-07-28 PROCEDURE — 92004 COMPRE OPH EXAM NEW PT 1/>: CPT | Performed by: OPHTHALMOLOGY

## 2022-07-28 NOTE — ASSESSMENT & PLAN NOTE
Patient noticed \"bend\" in vision in his right eye in April of 2022, but he says it resolved after 2 weeks. Discussed with patient that he still has some edema/ swelling in his right macula. Refer to Dr. Supa Cash for evaluation and treatment. Appointment was scheduled on 8/11/22 at 3:30 pm  with Dr. Dorita Short. .   Location is the [address]- [address] [place] [address]. Phone # [phone] (orange parking lot)   Patient should bring photo ID, insurance cards, they should be aware that their eyes will be dilated and they should expect to be there for at least 2 hours. The patient should bring a list of all medications.

## 2022-07-28 NOTE — PATIENT INSTRUCTIONS
Macular edema- right eye  Patient noticed \"bend\" in vision in his right eye in April of 2022, but he says it resolved after 2 weeks. Discussed with patient that he still has some edema/ swelling in his right macula. Refer to Dr. Cary Lorenzo for evaluation and treatment. Appointment was scheduled on 8/11/22 at 3:30 pm  with Dr. Nicole Willis at    . Location is the [place] [address] location- [address] [place] [address]. Phone # [phone] (orange parking lot)   Patient should bring photo ID, insurance cards, they should be aware that their eyes will be dilated and they should expect to be there for at least 2 hours. The patient should bring a list of all medications. Age-related nuclear cataract of both eyes  Discussed very early cataracts in both eyes that are not affecting vision and are not surgical at this time.

## 2022-08-11 ENCOUNTER — TELEPHONE (OUTPATIENT)
Dept: OPHTHALMOLOGY | Facility: CLINIC | Age: 57
End: 2022-08-11

## 2022-08-11 NOTE — TELEPHONE ENCOUNTER
Patient states office does not see referral on file. Spoke to  and they are asking to have referral faxed to Fax: [phone]. Gave them the referral number and stated they will see patient but still need referral faxed. Patient in office now.  Please advise

## 2022-08-11 NOTE — TELEPHONE ENCOUNTER
Retina assistant Dr. Giovani Hobbs office states pt needs referral printed and sent to them please advise

## 2022-08-11 NOTE — TELEPHONE ENCOUNTER
Spoke to Dr Tyler Martins office. Let them know that I found the referral in system. I printed a copy of it and faxed it over to them.

## 2023-04-03 ENCOUNTER — NURSE TRIAGE (OUTPATIENT)
Dept: FAMILY MEDICINE CLINIC | Facility: CLINIC | Age: 58
End: 2023-04-03

## 2023-04-03 NOTE — TELEPHONE ENCOUNTER
Advised patient of Dr Vonnie Libman  note. Patient verbalized understanding and had no further questions.  Scheduled for 5 pm on 4/5

## 2023-04-05 ENCOUNTER — TELEPHONE (OUTPATIENT)
Dept: FAMILY MEDICINE CLINIC | Facility: CLINIC | Age: 58
End: 2023-04-05

## 2023-04-05 ENCOUNTER — OFFICE VISIT (OUTPATIENT)
Dept: FAMILY MEDICINE CLINIC | Facility: CLINIC | Age: 58
End: 2023-04-05

## 2023-04-05 VITALS
HEART RATE: 56 BPM | RESPIRATION RATE: 16 BRPM | SYSTOLIC BLOOD PRESSURE: 123 MMHG | DIASTOLIC BLOOD PRESSURE: 72 MMHG | WEIGHT: 168 LBS | BODY MASS INDEX: 26 KG/M2 | TEMPERATURE: 98 F

## 2023-04-05 DIAGNOSIS — R10.32 LLQ PAIN: Primary | ICD-10-CM

## 2023-04-05 LAB
APPEARANCE: CLEAR
BILIRUBIN: NEGATIVE
GLUCOSE (URINE DIPSTICK): NEGATIVE MG/DL
KETONES (URINE DIPSTICK): NEGATIVE MG/DL
LEUKOCYTES: NEGATIVE
MULTISTIX LOT#: NORMAL NUMERIC
NITRITE, URINE: NEGATIVE
OCCULT BLOOD: NEGATIVE
PH, URINE: 5 (ref 4.5–8)
PROTEIN (URINE DIPSTICK): NEGATIVE MG/DL
SPECIFIC GRAVITY: 1.02 (ref 1–1.03)
URINE-COLOR: YELLOW
UROBILINOGEN,SEMI-QN: 0.2 MG/DL (ref 0–1.9)

## 2023-04-05 PROCEDURE — 3074F SYST BP LT 130 MM HG: CPT | Performed by: FAMILY MEDICINE

## 2023-04-05 PROCEDURE — 3078F DIAST BP <80 MM HG: CPT | Performed by: FAMILY MEDICINE

## 2023-04-05 PROCEDURE — 99214 OFFICE O/P EST MOD 30 MIN: CPT | Performed by: FAMILY MEDICINE

## 2023-04-05 PROCEDURE — 81003 URINALYSIS AUTO W/O SCOPE: CPT | Performed by: FAMILY MEDICINE

## 2023-04-05 NOTE — TELEPHONE ENCOUNTER
Pt was seen in the ER last night. Needs to have CT scan done. Ordered STAT. Can you call insurance for approval and then schedule through hospital? Can you call and let him know information then?   Thanks

## 2023-04-06 NOTE — TELEPHONE ENCOUNTER
Called patient (name and  verified) and advised to go to the ED with any worsening pain/symptoms. Patient verbalized understanding and agrees with plan.

## 2023-04-06 NOTE — TELEPHONE ENCOUNTER
Dr. Evon Penaloza, approval for CT scan obtained. Patient states that he wants to wait until tomorrow to have the CT. Please advise.

## 2023-04-06 NOTE — TELEPHONE ENCOUNTER
Patient called back. Explained STAT order for CT scan.     (patient was seen in office yesterday)  Should be done today; patient states he is unable to due to scheduling conflict with kids. Explained if he has abd pain should be done today. He was given central scheduling ph# as he wanted to schedule for Friday. Otherwise, if he is able to do today, CALL office back and we can get him scheduled today.

## 2023-04-07 ENCOUNTER — HOSPITAL ENCOUNTER (OUTPATIENT)
Dept: CT IMAGING | Facility: HOSPITAL | Age: 58
Discharge: HOME | End: 2023-04-07
Attending: FAMILY MEDICINE
Payer: COMMERCIAL

## 2023-04-07 DIAGNOSIS — R10.32 LLQ PAIN: ICD-10-CM

## 2023-04-07 PROCEDURE — 74176 CT ABD & PELVIS W/O CONTRAST: CPT | Performed by: FAMILY MEDICINE

## 2023-04-12 ENCOUNTER — TELEPHONE (OUTPATIENT)
Dept: FAMILY MEDICINE CLINIC | Facility: CLINIC | Age: 58
End: 2023-04-12

## 2023-04-12 NOTE — TELEPHONE ENCOUNTER
Patient calling regarding CT Abdomen results. Advised below. He verbalized understanding. Test results now post immediately to your [facility] account. However, your doctor may not have the chance to review or provide comments on them before the results reach you. Our providers review and comment on all test results, normal or otherwise. If you have not heard from our office with comments on your test results within the next 3-4 days, please contact us again on this message string so we can assist you.

## 2023-04-24 ENCOUNTER — HOSPITAL ENCOUNTER (OUTPATIENT)
Dept: GENERAL RADIOLOGY | Facility: HOSPITAL | Age: 58
Discharge: HOME | End: 2023-04-24
Attending: PODIATRIST
Payer: COMMERCIAL

## 2023-04-24 ENCOUNTER — TELEPHONE (OUTPATIENT)
Dept: FAMILY MEDICINE CLINIC | Facility: CLINIC | Age: 58
End: 2023-04-24

## 2023-04-24 ENCOUNTER — OFFICE VISIT (OUTPATIENT)
Dept: PODIATRY CLINIC | Facility: CLINIC | Age: 58
End: 2023-04-24

## 2023-04-24 DIAGNOSIS — M79.671 RIGHT FOOT PAIN: ICD-10-CM

## 2023-04-24 DIAGNOSIS — M79.671 RIGHT FOOT PAIN: Primary | ICD-10-CM

## 2023-04-24 DIAGNOSIS — M77.8 CAPSULITIS OF FOOT, RIGHT: ICD-10-CM

## 2023-04-24 DIAGNOSIS — M20.21 HALLUX RIGIDUS OF RIGHT FOOT: Primary | ICD-10-CM

## 2023-04-24 PROCEDURE — 73630 X-RAY EXAM OF FOOT: CPT | Performed by: PODIATRIST

## 2023-04-24 RX ORDER — MELOXICAM 7.5 MG/1
7.5 TABLET ORAL DAILY PRN
Qty: 30 TABLET | Refills: 0 | Status: SHIPPED | OUTPATIENT
Start: 2023-04-24 | End: 2023-05-24

## 2023-04-24 NOTE — TELEPHONE ENCOUNTER
Patient calling ( identified name and  ) needs a referral for Podiatry  ( last one  )     Has an appointment with Dr. Aime Quiroz  today       Referral pended for your review, completion and approval.      Thank you

## 2023-05-03 NOTE — TELEPHONE ENCOUNTER
Pt is returning the nurses call. Bilobed Flap Text: The defect edges were debeveled with a #15 scalpel blade.  Given the location of the defect and the proximity to free margins a bilobe flap was deemed most appropriate.  Using a sterile surgical marker, an appropriate bilobed flap drawn around the defect.    The area thus outlined was incised deep to adipose tissue with a #15 scalpel blade and carried over to the primary defect.  The skin margins were undermined to an appropriate distance in all directions utilizing iris scissors.

## 2023-05-24 ENCOUNTER — OFFICE VISIT (OUTPATIENT)
Dept: PODIATRY CLINIC | Facility: CLINIC | Age: 58
End: 2023-05-24

## 2023-05-24 DIAGNOSIS — M19.071 ARTHRITIS OF RIGHT FOOT: Primary | ICD-10-CM

## 2023-05-24 PROCEDURE — 99213 OFFICE O/P EST LOW 20 MIN: CPT | Performed by: PODIATRIST

## 2023-05-24 RX ORDER — MELOXICAM 7.5 MG/1
7.5 TABLET ORAL DAILY PRN
Qty: 30 TABLET | Refills: 0 | Status: SHIPPED | OUTPATIENT
Start: 2023-05-24 | End: 2023-06-23

## 2023-06-01 RX ORDER — MELOXICAM 7.5 MG/1
TABLET ORAL
Qty: 30 TABLET | Refills: 0 | OUTPATIENT
Start: 2023-06-01

## 2023-10-16 ENCOUNTER — OFFICE VISIT (OUTPATIENT)
Dept: PODIATRY CLINIC | Facility: CLINIC | Age: 58
End: 2023-10-16
Payer: COMMERCIAL

## 2023-10-16 DIAGNOSIS — L84 FOOT CALLUS: Primary | ICD-10-CM

## 2023-10-16 DIAGNOSIS — M21.622 TAILOR'S BUNION OF LEFT FOOT: ICD-10-CM

## 2023-10-16 DIAGNOSIS — M79.672 LEFT FOOT PAIN: ICD-10-CM

## 2023-10-16 PROCEDURE — 99213 OFFICE O/P EST LOW 20 MIN: CPT | Performed by: PODIATRIST

## 2023-10-16 NOTE — PROGRESS NOTES
[facility], [facility] Podiatry  Progress Note    Millard Severin is a 59year old male. Patient presents with: Foot Pain: R foot f/u-  also L foot check for callus- rates pain 5/10 worse when putting pressure on it        HPI:     This is a pleasant male with PMH right 1st MPJ cheilectomy on 21. He presents to clinic today due to Left plantar foot callus pain. He states it has been present for about 6 months. He has tried removing it at home but it just grew back. Allergies: Penicillamine and Penicillins   Current Outpatient Medications   Medication Sig Dispense Refill    Sildenafil Citrate (VIAGRA) 50 MG Oral Tab Take 1 tablet (50 mg total) by mouth daily as needed for Erectile Dysfunction. (Patient not taking: Reported on 2023) 12 tablet 1    Multiple Vitamins-Minerals (CENTRUM SILVER 50+MEN OR) Take by mouth. Past Medical History:   Diagnosis Date    Central serous chorioretinopathy of eye, right 2022    Seen by Dr. Clarita Tsai edema- right eye 2022    Screen for colon cancer     repeat CLN in 10 years      Past Surgical History:   Procedure Laterality Date    COLONOSCOPY  2014    OTHER SURGICAL HISTORY      bilateral knee- meniscus repair      Family History   Problem Relation Age of Onset    Heart Disorder Mother     Heart Disorder Maternal Grandmother     Diabetes Neg     Glaucoma Neg     Macular degeneration Neg       Social History    Socioeconomic History      Marital status:     Tobacco Use      Smoking status: Former        Years: 2        Types: Cigarettes        Quit date: 3/5/2021        Years since quittin.6      Smokeless tobacco: Never      Tobacco comments: occasionally    Vaping Use      Vaping Use: Never used    Substance and Sexual Activity      Alcohol use:  Yes        Alcohol/week: 12.0 standard drinks of alcohol        Types: 12 Cans of beer per week        Comment: Socially      Drug use: Never          REVIEW OF SYSTEMS:   Denies Cardiology nausea, fever, chills  No calf pain  Denies chest pain or shortness of breath. EXAM:   There were no vitals taken for this visit. Constitutional:   Patient in no apparent distress. Well kept Of normal body habitus. Alert and oriented to person, place, and time. Integumentary examination:   There are no varicosities. Skin appears moist, warm, and supple with positive hair growth. Left sub 5th met head callus    Vascular examination:   DP pulse is 2/4  PT pulse is 2/4  Capillary refill is immediate    Neurological examination:   Vibratory (128-Hz tuning fork) sensation is present to right and is present to left. Sharp/dull is present to right and is present to left. Musculoskeletal examination:  Muscle Strength is 5/5. Right hallux severely decreased DF at MPJ with no pain      Left tailor bunion with POP to left sub 5th met head       LABS & IMAGING:     Lab Results   Component Value Date    GLU 88 06/01/2022    BUN 17 06/01/2022    CREATSERUM 0.93 06/01/2022    BUNCREA 18.3 06/01/2022    ANIONGAP 7 06/01/2022    GFRAA 104 06/01/2022    GFRNAA 90 06/01/2022    CA 8.8 06/01/2022     06/01/2022    K 3.6 06/01/2022     06/01/2022    CO2 26.0 06/01/2022    OSMOCALC 287 06/01/2022        No results found for: \"EAG\", \"A1C\"     No results found. ASSESSMENT AND PLAN:   Diagnoses and all orders for this visit:    Foot callus    Tailor's bunion of left foot    Left foot pain          Plan:       Discussed the etiology of this condition as well as both conservative and surgical treatment options. Discussed conservative measures to include wide shoes, extra depth shoes, padding, offloading, orthotics  Evaluated patient. Discussed treatment options with patient. Discussed proper hygiene and care for feet as well as use of emollient creams (ie Urea based creams)  Answered all patient questions.  Discussed offloading hyperkeratotic lesions with proper shoe gear, offloading pads, and insoles. Procedures: (CPT 22385 Paring or cutting of benign hyperkeratotic lesion)  One lesion pared utilizing #15 blade left foot without incident. Explained to pt that the left sub 5th met head callus is due to his tailor bunion  He is not interested in surgery at this time. Pt will check orthotic coverage, managed care order placed    RTC 1 month for possible orthotic casting and will add left sub 5th met head accomodation. No follow-ups on file.     Slime Coles DPM  10/16/23 Pulmonology Heme/Onc

## 2023-10-31 ENCOUNTER — PATIENT MESSAGE (OUTPATIENT)
Dept: PODIATRY CLINIC | Facility: CLINIC | Age: 58
End: 2023-10-31

## 2023-10-31 NOTE — TELEPHONE ENCOUNTER
From: Taty Lemus  To: Arden Lyon  Sent: 10/31/2023 11:16 AM CDT  Subject: Meloxicam     I would like to have a prescription refill if I may please thank you

## 2023-10-31 NOTE — TELEPHONE ENCOUNTER
Patient seen on 10/16/23 patient requested refill of meloxicam.     Meloxicam 7.5mg  Last rx: 5/24/23, #30, 0 refills  LOV: 5/16/23    Please advise on refill

## 2023-11-01 RX ORDER — MELOXICAM 7.5 MG/1
7.5 TABLET ORAL
Qty: 30 TABLET | Refills: 0 | OUTPATIENT
Start: 2023-11-01

## 2023-11-01 RX ORDER — MELOXICAM 7.5 MG/1
7.5 TABLET ORAL DAILY PRN
Qty: 30 TABLET | Refills: 0 | Status: SHIPPED | OUTPATIENT
Start: 2023-11-01 | End: 2023-12-01

## 2023-12-08 RX ORDER — MELOXICAM 7.5 MG/1
7.5 TABLET ORAL
Qty: 30 TABLET | Refills: 0 | Status: SHIPPED | OUTPATIENT
Start: 2023-12-08

## 2023-12-08 NOTE — TELEPHONE ENCOUNTER
Rx request please review and sign off if appropriate. Thank you. Last seen: 10/16/23  Last refill: 11/1/23 # 30 with 0 refills.

## 2024-01-09 ENCOUNTER — NURSE TRIAGE (OUTPATIENT)
Dept: FAMILY MEDICINE CLINIC | Facility: CLINIC | Age: 59
End: 2024-01-09

## 2024-01-09 NOTE — TELEPHONE ENCOUNTER
Please reply to pool: EM RN TRIAGE  Action Requested: Summary for Provider     []  Critical Lab, Recommendations Needed  [] Need Additional Advice  [x]   FYI    []   Need Orders  [] Need Medications Sent to Pharmacy  []  Other     SUMMARY: Patient contacts clinic reporting eye redness with scant crusting that began yesterday.  Recent viral illness that has resolved.  Denies periorbital swelling, vision loss or pain in the eye.  Denies fever, body aches or chills.  Using warm compress and saline eye drops.  RN advised continued home care.  Report back if symptoms not improving.  He verbalized understanding and compliance.    Reason for call: Eye Problem  Onset: Data Unavailable                       Reason for Disposition   Very small amount of discharge and only in corner of eye    Protocols used: Eye - Pus or [id number]

## 2024-01-10 NOTE — TELEPHONE ENCOUNTER
Patient calling back today with update, still same symptoms. Doing warm compresses, saline washes to eyes. No redness in lids, just eyes them selves are red. No pain, no swelling.     Patient scheduled tomorrow if not improving for evaluation, patient agreeable.     Will continue home care advice given. Warnings signs to call us back with if symptoms develop prior to visit.   Patient states understanding and agrees to plan.

## 2024-01-11 ENCOUNTER — OFFICE VISIT (OUTPATIENT)
Dept: FAMILY MEDICINE CLINIC | Facility: CLINIC | Age: 59
End: 2024-01-11
Payer: COMMERCIAL

## 2024-01-11 VITALS
RESPIRATION RATE: 16 BRPM | DIASTOLIC BLOOD PRESSURE: 73 MMHG | TEMPERATURE: 98 F | HEART RATE: 65 BPM | WEIGHT: 174 LBS | BODY MASS INDEX: 27 KG/M2 | SYSTOLIC BLOOD PRESSURE: 138 MMHG

## 2024-01-11 DIAGNOSIS — H57.89 EYE DRAINAGE: Primary | ICD-10-CM

## 2024-01-11 PROCEDURE — 3075F SYST BP GE 130 - 139MM HG: CPT | Performed by: FAMILY MEDICINE

## 2024-01-11 PROCEDURE — 3078F DIAST BP <80 MM HG: CPT | Performed by: FAMILY MEDICINE

## 2024-01-11 PROCEDURE — 99213 OFFICE O/P EST LOW 20 MIN: CPT | Performed by: FAMILY MEDICINE

## 2024-01-11 NOTE — PROGRESS NOTES
HPI: Abdulkadir is a 58 year old male who presents for bilateral eye drainage.  Woke up with eyes completely crusted. Happened on Tuesday as well.  Using warm compresses and washing it out with saline. Eyes are irritated. Drainage has gotten better. Had a cold around [place] and has sinus congestion.     PMH:    Past Medical History:   Diagnosis Date    Central serous chorioretinopathy of eye, right 08/11/2022    Seen by Dr. Potter    Macular edema- right eye 7/28/2022    Screen for colon cancer 2022    repeat CLN in 10 years      Alg:  Penicillamine and Penicillins   Meds:   Current Outpatient Medications on File Prior to Visit   Medication Sig Dispense Refill    Meloxicam 7.5 MG Oral Tab Take 1 tablet (7.5 mg total) by mouth daily as needed for Pain. 30 tablet 0    Multiple Vitamins-Minerals (CENTRUM SILVER 50+MEN OR) Take by mouth.       No current facility-administered medications on file prior to visit.      Tobacco Use: no    ROS: see HPI    Objective:   Gen: AOx3. NAD.  /73   Pulse 65   Temp 98.2 °F (36.8 °C) (Temporal)   Resp 16   Wt 174 lb (78.9 kg)   BMI 26.85 kg/m²   Eyes: Bilateral conjunctiva- mild erythema noted.  PERRLA. EOMI.  No drainage noted  HEENT:  Robby ear canals clear.  Robby TMs intact with good landmarks noted.  Nares patent.  Oral mucous membrane moist.  Normal lips, teeth, and gums.  Oropharynx normal.  Neck supple.  Good ROM.  No LAD.   CV:  Regular rate and rhythm; no murmurs  Lungs:  Clear to ausculation; good aeration               No wheezes, rales or rhonchi        Assessment:/Plan:  Encounter Diagnosis   Name Primary?    Eye drainage Yes       Over 50% improved since Monday. Likely due to congestion versus viral conjunctivitis. Advised to continue warm compresses and saline as needed.  May use anti-histamine and Flonase for congestion. Call if drainage and redness is worsening.  Will send antibiotic drop to pharmacy.     Colleen Weiler, DO

## 2024-01-17 NOTE — TELEPHONE ENCOUNTER
Last visit 10/16/23  Last rx given 12/8/23 #30 no refill  No scheduled f/u appt.  Do you approve refill?

## 2024-01-18 RX ORDER — MELOXICAM 7.5 MG/1
7.5 TABLET ORAL
Qty: 30 TABLET | Refills: 0 | OUTPATIENT
Start: 2024-01-18

## 2024-03-04 ENCOUNTER — OFFICE VISIT (OUTPATIENT)
Dept: PODIATRY CLINIC | Facility: CLINIC | Age: 59
End: 2024-03-04

## 2024-03-04 ENCOUNTER — HOSPITAL ENCOUNTER (OUTPATIENT)
Dept: GENERAL RADIOLOGY | Facility: HOSPITAL | Age: 59
Discharge: HOME | End: 2024-03-04
Attending: PODIATRIST
Payer: COMMERCIAL

## 2024-03-04 DIAGNOSIS — M79.671 RIGHT FOOT PAIN: ICD-10-CM

## 2024-03-04 DIAGNOSIS — M19.071 ARTHRITIS OF FIRST METATARSOPHALANGEAL (MTP) JOINT OF RIGHT FOOT: ICD-10-CM

## 2024-03-04 DIAGNOSIS — M19.071 ARTHRITIS OF FIRST METATARSOPHALANGEAL (MTP) JOINT OF RIGHT FOOT: Primary | ICD-10-CM

## 2024-03-04 PROCEDURE — 99213 OFFICE O/P EST LOW 20 MIN: CPT | Performed by: PODIATRIST

## 2024-03-04 PROCEDURE — 73630 X-RAY EXAM OF FOOT: CPT | Performed by: PODIATRIST

## 2024-03-04 RX ORDER — MELOXICAM 7.5 MG/1
7.5 TABLET ORAL
Qty: 30 TABLET | Refills: 1 | Status: SHIPPED | OUTPATIENT
Start: 2024-03-04 | End: 2024-05-03

## 2024-03-04 NOTE — PROGRESS NOTES
[facility] Podiatry  Progress Note    Abdulkadir Butts is a 58 year old male.   Chief Complaint   Patient presents with    Foot Pain     R foot f/u - Pt is having pain where he had sx, onset  weeks ago. Pt state he feels a knot under his arch. On and off tingling.  No swelling          HPI:     This is a pleasant male with PMH right 1st MPJ cheilectomy on 8/5/21.     He presents to clinic today due to right great toe joint pain.  He denies any injury but did have pain a few weeks ago.  He states the pain has almost resolved but still mild.        Allergies: Penicillamine and Penicillins   Current Outpatient Medications   Medication Sig Dispense Refill    Meloxicam 7.5 MG Oral Tab Take 1 tablet (7.5 mg total) by mouth daily as needed for Pain. 30 tablet 0    Multiple Vitamins-Minerals (CENTRUM SILVER 50+MEN OR) Take by mouth.        Past Medical History:   Diagnosis Date    Central serous chorioretinopathy of eye, right 08/11/2022    Seen by Dr. Potter    Macular edema- right eye 7/28/2022    Screen for colon cancer 2022    repeat CLN in 10 years      Past Surgical History:   Procedure Laterality Date    COLONOSCOPY  02/05/2014    OTHER SURGICAL HISTORY      bilateral knee- meniscus repair      Family History   Problem Relation Age of Onset    Heart Disorder Mother     Heart Disorder Maternal Grandmother     Diabetes Neg     Glaucoma Neg     Macular degeneration Neg       Social History     Socioeconomic History    Marital status:    Tobacco Use    Smoking status: Former     Years: 2     Types: Cigarettes     Quit date: 3/5/2021     Years since quitting: 3.0    Smokeless tobacco: Never    Tobacco comments:     occasionally   Vaping Use    Vaping Use: Never used   Substance and Sexual Activity    Alcohol use: Yes     Alcohol/week: 12.0 standard drinks of alcohol     Types: 12 Cans of beer per week     Comment: Socially    Drug use: Never           REVIEW OF SYSTEMS:   Denies nausea, fever, chills  No calf  pain  Denies chest pain or shortness of breath.      EXAM:   There were no vitals taken for this visit.    Constitutional:   Patient in no apparent distress. Well kept Of normal body habitus. Alert and oriented to person, place, and time.  Integumentary examination:   There are no varicosities.   Skin appears moist, warm, and supple with positive hair growth.     Left sub 5th met head callus    Vascular examination:   DP pulse is 2/4  PT pulse is 2/4  Capillary refill is immediate    Neurological examination:   Vibratory (128-Hz tuning fork) sensation is present to right and is present to left.  Sharp/dull is present to right and is present to left.  Musculoskeletal examination:  Muscle Strength is 5/5.    Right hallux severely decreased DF at MPJ with mild pain      Left tailor bunion with POP to left sub 5th met head       LABS & IMAGING:     Lab Results   Component Value Date    GLU 88 06/01/2022    BUN 17 06/01/2022    CREATSERUM 0.93 06/01/2022    BUNCREA 18.3 06/01/2022    ANIONGAP 7 06/01/2022    GFRAA 104 06/01/2022    GFRNAA 90 06/01/2022    CA 8.8 06/01/2022     06/01/2022    K 3.6 06/01/2022     06/01/2022    CO2 26.0 06/01/2022    OSMOCALC 287 06/01/2022        No results found for: \"EAG\", \"A1C\"     No results found.     ASSESSMENT AND PLAN:   Diagnoses and all orders for this visit:    Arthritis of first metatarsophalangeal (MTP) joint of right foot    Right foot pain            Plan:       Discussed the etiology of this condition as well as both conservative and surgical treatment options.  Discussed conservative measures to include wide shoes, extra depth shoes, padding, offloading, orthotics, anti-inflammatory medication, and/or steroid injections.    Ordered new Right foot full WB xrays  Cont OTC powerstep inserts  Prescribed mobic PRN pain  RTC 2-3 weeks for xray review.        No follow-ups on file.    Vaughn Lyon DPM  3/4/24

## 2024-03-13 ENCOUNTER — LAB ENCOUNTER (OUTPATIENT)
Dept: LAB | Age: 59
End: 2024-03-13
Attending: FAMILY MEDICINE
Payer: COMMERCIAL

## 2024-03-13 ENCOUNTER — OFFICE VISIT (OUTPATIENT)
Dept: FAMILY MEDICINE CLINIC | Facility: CLINIC | Age: 59
End: 2024-03-13
Payer: COMMERCIAL

## 2024-03-13 VITALS
SYSTOLIC BLOOD PRESSURE: 130 MMHG | TEMPERATURE: 98 F | DIASTOLIC BLOOD PRESSURE: 73 MMHG | HEART RATE: 60 BPM | BODY MASS INDEX: 26.37 KG/M2 | HEIGHT: 67.72 IN | WEIGHT: 172 LBS | RESPIRATION RATE: 16 BRPM

## 2024-03-13 DIAGNOSIS — Z00.00 ROUTINE PHYSICAL EXAMINATION: Primary | ICD-10-CM

## 2024-03-13 DIAGNOSIS — H35.81 MACULAR EDEMA: ICD-10-CM

## 2024-03-13 DIAGNOSIS — N52.9 ERECTILE DYSFUNCTION, UNSPECIFIED ERECTILE DYSFUNCTION TYPE: ICD-10-CM

## 2024-03-13 DIAGNOSIS — M20.21 HALLUX RIGIDUS OF RIGHT FOOT: ICD-10-CM

## 2024-03-13 DIAGNOSIS — Z00.00 ROUTINE PHYSICAL EXAMINATION: ICD-10-CM

## 2024-03-13 DIAGNOSIS — Z01.00 VISIT FOR EYE AND VISION EXAM: ICD-10-CM

## 2024-03-13 DIAGNOSIS — R35.1 NOCTURIA: ICD-10-CM

## 2024-03-13 LAB
ALBUMIN SERPL-MCNC: 4.7 G/DL (ref 3.2–4.8)
ALBUMIN/GLOB SERPL: 1.6 {RATIO} (ref 1–2)
ALP LIVER SERPL-CCNC: 68 U/L
ALT SERPL-CCNC: 20 U/L
ANION GAP SERPL CALC-SCNC: 6 MMOL/L (ref 0–18)
AST SERPL-CCNC: 24 U/L (ref ?–34)
BILIRUB SERPL-MCNC: 0.4 MG/DL (ref 0.2–1.1)
BUN BLD-MCNC: 16 MG/DL (ref 9–23)
BUN/CREAT SERPL: 17.4 (ref 10–20)
CALCIUM BLD-MCNC: 9.5 MG/DL (ref 8.7–10.4)
CHLORIDE SERPL-SCNC: 107 MMOL/L (ref 98–112)
CHOLEST SERPL-MCNC: 216 MG/DL (ref ?–200)
CO2 SERPL-SCNC: 28 MMOL/L (ref 21–32)
COMPLEXED PSA SERPL-MCNC: 0.47 NG/ML (ref ?–4)
CREAT BLD-MCNC: 0.92 MG/DL
DEPRECATED RDW RBC AUTO: 46 FL (ref 35.1–46.3)
EGFRCR SERPLBLD CKD-EPI 2021: 95 ML/MIN/1.73M2 (ref 60–?)
ERYTHROCYTE [DISTWIDTH] IN BLOOD BY AUTOMATED COUNT: 13.4 % (ref 11–15)
FASTING PATIENT LIPID ANSWER: NO
FASTING STATUS PATIENT QL REPORTED: NO
GLOBULIN PLAS-MCNC: 2.9 G/DL (ref 2.8–4.4)
GLUCOSE BLD-MCNC: 97 MG/DL (ref 70–99)
HCT VFR BLD AUTO: 39.8 %
HDLC SERPL-MCNC: 45 MG/DL (ref 40–59)
HGB BLD-MCNC: 13.9 G/DL
LDLC SERPL CALC-MCNC: 97 MG/DL (ref ?–100)
MCH RBC QN AUTO: 32.8 PG (ref 26–34)
MCHC RBC AUTO-ENTMCNC: 34.9 G/DL (ref 31–37)
MCV RBC AUTO: 93.9 FL
NONHDLC SERPL-MCNC: 171 MG/DL (ref ?–130)
OSMOLALITY SERPL CALC.SUM OF ELEC: 293 MOSM/KG (ref 275–295)
PLATELET # BLD AUTO: 229 10(3)UL (ref 150–450)
POTASSIUM SERPL-SCNC: 4 MMOL/L (ref 3.5–5.1)
PROT SERPL-MCNC: 7.6 G/DL (ref 5.7–8.2)
RBC # BLD AUTO: 4.24 X10(6)UL
SODIUM SERPL-SCNC: 141 MMOL/L (ref 136–145)
TRIGL SERPL-MCNC: 444 MG/DL (ref 30–149)
TSI SER-ACNC: 3.51 MIU/ML (ref 0.55–4.78)
VLDLC SERPL CALC-MCNC: 75 MG/DL (ref 0–30)
WBC # BLD AUTO: 6.8 X10(3) UL (ref 4–11)

## 2024-03-13 PROCEDURE — 80061 LIPID PANEL: CPT

## 2024-03-13 PROCEDURE — 36415 COLL VENOUS BLD VENIPUNCTURE: CPT

## 2024-03-13 PROCEDURE — 3008F BODY MASS INDEX DOCD: CPT | Performed by: FAMILY MEDICINE

## 2024-03-13 PROCEDURE — 3078F DIAST BP <80 MM HG: CPT | Performed by: FAMILY MEDICINE

## 2024-03-13 PROCEDURE — 99396 PREV VISIT EST AGE 40-64: CPT | Performed by: FAMILY MEDICINE

## 2024-03-13 PROCEDURE — 85027 COMPLETE CBC AUTOMATED: CPT

## 2024-03-13 PROCEDURE — 80053 COMPREHEN METABOLIC PANEL: CPT

## 2024-03-13 PROCEDURE — 84443 ASSAY THYROID STIM HORMONE: CPT

## 2024-03-13 PROCEDURE — 3075F SYST BP GE 130 - 139MM HG: CPT | Performed by: FAMILY MEDICINE

## 2024-03-13 NOTE — PROGRESS NOTES
HPI:  58 yr old male who presents for physical. . Works for [employer]. Walks regularly. Does 10,000 steps per day. Stretches daily.  Eats healthy.     Started smoking again on the weekends. Has about 1/2 pack per weekend.     Pt states he finds himself getting up more often at night to urinate.  No pain.  No blood in the urine. Has urgency occasionally to urinate. Still has strong stream.  Empties bladder completely. No family history of cancer. Had ED in the past and was prescribed Viagra.  Insurance never improved.     Has chronic right foot pain.  Follows with Podiatry.     Declined Shingles vaccine.     PMHx: reviewed, see chart  PSHx: reviewed, see chart  All: Penicillamine and Penicillins   Meds: see chart    ROS:   Allergic/Immuno:  Negative for environmental allergies and food allergies  Cardiovascular:  Negative for chest pain and irregular heartbeat/palpitations  Constitutional:  Negative for decreased activity, fever, irritability and lethargy  Endocrine:  Negative for abnormal sleep patterns, increased activity, polydipsia and polyphagia  ENMT:  Negative for ear drainage, hearing loss and nasal drainage  Eyes:  Negative for eye discharge and vision loss  Gastrointestinal:  Negative for abdominal pain, constipation, decreased appetite, diarrhea and vomiting  Genitourinary:  Positive for nocturia  Hema/Lymph:  Negative for easy bleeding and easy bruising  Integumentary:  Negative for pruritus and rash  Musculoskeletal:  Negative for bone/joint symptoms  Neurological:  Negative for gait disturbance  Psychiatric:  Negative for inappropriate interaction and psychiatric symptoms    PE:  /73   Pulse 60   Temp 98.3 °F (36.8 °C) (Temporal)   Resp 16   Ht 5' 7.72\" (1.72 m)   Wt 172 lb (78 kg)   BMI 26.37 kg/m²   Gen:  Well-nourished.  No distress.  HEENT: Conjunctive clear.  Robby ear canals clear.  Robby TMs intact with good landmarks noted.  Nares patent.  Oral mucous membrane moist.  Normal  lips, teeth, and gums.  Oropharynx normal.  Neck supple.  Good ROM.  No LAD.  Thyroid normal.  CV:  Regular rate and rhythm; no murmurs  Lungs:  Clear to ausculation; good aeration               No wheezes, rales or rhonchi  Abd: soft, non-tender, non-distended          Normal bowel sounds; no masses          No hepatosplenomegaly  Extremities: No cyanosis, clubbing, edema.  Pedal pulses 2+ jackie.  MSK:  No abnormalities.  Skin: Warm/dry/intact.  No abnormal moles/lesions noted.  No rashes.  Psych: Orientated to person, place, and time.  Behavior and affect appropriate for age.    A/P:  Encounter Diagnoses   Name Primary?    Routine physical examination    Healthy.  Encouraged regular exercise.  Encouraged patient to stop smoking.    Yes    Nocturia    Check PSA today.        Erectile dysfunction, unspecified erectile dysfunction   Type    Ongoing.  Insurance would not cover Viagra.  Will refer to Urology       Macular edema    Refer to Ophtho       Hallux rigidus of right foot    Chronic discomfort.  Following with Podiatry.        Visit for eye and vision exam    Refer to Ophtho.       Colleen Weiler, DO

## 2024-07-01 ENCOUNTER — OFFICE VISIT (OUTPATIENT)
Dept: FAMILY MEDICINE CLINIC | Facility: CLINIC | Age: 59
End: 2024-07-01
Payer: COMMERCIAL

## 2024-07-01 VITALS
RESPIRATION RATE: 16 BRPM | TEMPERATURE: 98 F | HEART RATE: 61 BPM | BODY MASS INDEX: 26 KG/M2 | DIASTOLIC BLOOD PRESSURE: 74 MMHG | WEIGHT: 171 LBS | SYSTOLIC BLOOD PRESSURE: 133 MMHG

## 2024-07-01 DIAGNOSIS — H93.13 TINNITUS OF BOTH EARS: Primary | ICD-10-CM

## 2024-07-01 DIAGNOSIS — R42 VERTIGO: ICD-10-CM

## 2024-07-01 PROCEDURE — 99213 OFFICE O/P EST LOW 20 MIN: CPT | Performed by: FAMILY MEDICINE

## 2024-07-01 PROCEDURE — 3075F SYST BP GE 130 - 139MM HG: CPT | Performed by: FAMILY MEDICINE

## 2024-07-01 PROCEDURE — 3078F DIAST BP <80 MM HG: CPT | Performed by: FAMILY MEDICINE

## 2024-07-01 NOTE — PROGRESS NOTES
HPI: Abdulkadir is a 58 year old male who presents for questionable ear pain.  One week ago, left ear started to get sore all around outside.  It then switched to right side. Last Thursday, he developed dizziness after waking up. Started experiencing spinning if he looked to his right. Went away mid afternoon. Had mild symptoms jagjit the weekend.  Woke up with ringing in both ears this morning.  No hearing changes. No pain.  No recent illness. Never happened before. No headaches. Wears earplugs daily. Last got hearing checked 2 years ago at work.     PMH:    Past Medical History:    Central serous chorioretinopathy of eye, right    Seen by Dr. Potter    Macular edema- right eye    Screen for colon cancer    repeat CLN in 10 years      Alg:  Penicillamine and Penicillins   Meds:   Current Outpatient Medications on File Prior to Visit   Medication Sig Dispense Refill    Multiple Vitamins-Minerals (CENTRUM SILVER 50+MEN OR) Take by mouth.      Aspirin-Acetaminophen-Caffeine (EXCEDRIN OR) Take by mouth.       No current facility-administered medications on file prior to visit.      Tobacco Use: no    Objective:   Gen: AOx3. NAD  /74   Pulse 61   Temp 97.9 °F (36.6 °C) (Temporal)   Resp 16   Wt 171 lb (77.6 kg)   BMI 26.22 kg/m²   CV:  Regular rate and rhythm; no murmurs  Lungs:  Clear to ausculation; good aeration               No wheezes, rales or rhonchi      Assessment:/Plan:  Encounter Diagnoses   Name Primary?    Tinnitus of both ears Yes    Vertigo        No further significant symptoms. Discussed potential etiologies.  Advised hydration, decreased caffeine, and decreased salt. Will refer to ENT for evaluation and hearing test.  Advised to notify partners at work of symptoms and avoid situations that may put him in harm's way.  Pt verbalized understanding.     Colleen Weiler, DO

## 2024-07-06 ENCOUNTER — OFFICE VISIT (OUTPATIENT)
Dept: OTOLARYNGOLOGY | Facility: CLINIC | Age: 59
End: 2024-07-06
Payer: COMMERCIAL

## 2024-07-06 ENCOUNTER — OFFICE VISIT (OUTPATIENT)
Dept: AUDIOLOGY | Facility: CLINIC | Age: 59
End: 2024-07-06

## 2024-07-06 VITALS — BODY MASS INDEX: 26.22 KG/M2 | WEIGHT: 171 LBS | HEIGHT: 67.72 IN

## 2024-07-06 DIAGNOSIS — H93.13 TINNITUS OF BOTH EARS: Primary | ICD-10-CM

## 2024-07-06 DIAGNOSIS — H93.13 BILATERAL TINNITUS: Primary | ICD-10-CM

## 2024-07-06 DIAGNOSIS — H93.13 TINNITUS, BILATERAL: ICD-10-CM

## 2024-07-06 PROCEDURE — 92567 TYMPANOMETRY: CPT | Performed by: AUDIOLOGIST

## 2024-07-06 PROCEDURE — 3008F BODY MASS INDEX DOCD: CPT | Performed by: OTOLARYNGOLOGY

## 2024-07-06 PROCEDURE — 92557 COMPREHENSIVE HEARING TEST: CPT | Performed by: AUDIOLOGIST

## 2024-07-06 PROCEDURE — 99203 OFFICE O/P NEW LOW 30 MIN: CPT | Performed by: OTOLARYNGOLOGY

## 2024-07-06 RX ORDER — PREDNISONE 10 MG/1
TABLET ORAL
Qty: 44 TABLET | Refills: 0 | Status: SHIPPED | OUTPATIENT
Start: 2024-07-06

## 2024-07-06 NOTE — PROGRESS NOTES
Abdulkadir Butts is a 58 year old male.    Chief Complaint   Patient presents with    Ringing In Ear     Tinnitus of both ears       HISTORY OF PRESENT ILLNESS  He presents with sudden onset of feeling unsteady about 9 days ago.  States that he woke up he felt a little bit off cannot really explain exactly what he fall but he denies any motion specifically denying any spinning vertigo or imbalance.  States that he drank a lot of water left the house to go to work and went down a staircase and truly felt unsteady at that time.  Symptoms resolved within an hour.  2 days later similar episodes occurred and has not happened since then in the past week.  No other otologic signs or symptoms.  Hearing test was performed today based on his symptoms..  I did review the study and interpreted the results and went over them with him.  He has normal hearing at the low and mid frequencies bilaterally with slight asymmetry in hearing loss at about 4000 Hz on the right which is moderate and mild on the left.  Upsloping to normal after that.  Normal tympanograms and speech discrimination scores.  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  Exposed to very loud noise consistently but wears plugs and earmuffs.  States that about a week ago he started having louder tinnitus in both ears.  Unclear if he has had the tinnitus before but he just notes that it is much louder now.  Sent by Dr. Weiler for my opinion regarding his symptoms.      Social History     Socioeconomic History    Marital status:    Tobacco Use    Smoking status: Some Days     Current packs/day: 0.00     Types: Cigarettes     Start date: 3/5/2019     Last attempt to quit: 3/5/2021     Years since quitting: 3.3     Passive exposure: Never    Smokeless tobacco: Never    Tobacco comments:     Few times a week   Vaping Use    Vaping status: Never Used   Substance and Sexual Activity    Alcohol use: Yes     Alcohol/week: 12.0 standard  drinks of alcohol     Types: 12 Cans of beer per week     Comment: Socially    Drug use: Never       Family History   Problem Relation Age of Onset    Heart Disorder Mother     Heart Disorder Maternal Grandmother     Diabetes Neg     Glaucoma Neg     Macular degeneration Neg        Past Medical History:    Central serous chorioretinopathy of eye, right    Seen by Dr. Potter    Macular edema- right eye    Screen for colon cancer    repeat CLN in 10 years       Past Surgical History:   Procedure Laterality Date    Colonoscopy  02/05/2014    Other surgical history      bilateral knee- meniscus repair         REVIEW OF SYSTEMS    System Neg/Pos Details   Constitutional Negative Fatigue, fever and weight loss.   ENMT Negative Drooling.   Eyes Negative Blurred vision and vision changes.   Respiratory Negative Dyspnea and wheezing.   Cardio Negative Chest pain, irregular heartbeat/palpitations and syncope.   GI Negative Abdominal pain and diarrhea.   Endocrine Negative Cold intolerance and heat intolerance.   Neuro Negative Tremors.   Psych Negative Anxiety and depression.   Integumentary Negative Frequent skin infections, pigment change and rash.   Hema/Lymph Negative Easy bleeding and easy bruising.           PHYSICAL EXAM    Ht 5' 7.72\" (1.72 m)   Wt 171 lb (77.6 kg)   BMI 26.22 kg/m²        Constitutional Normal Overall appearance - Normal.   Psychiatric Normal Orientation - Oriented to time, place, person & situation. Appropriate mood and affect.   Neck Exam Normal Inspection - Normal. Palpation - Normal. Parotid gland - Normal. Thyroid gland - Normal.   Eyes Normal Conjunctiva - Right: Normal, Left: Normal. Pupil - Right: Normal, Left: Normal. Fundus - Right: Normal, Left: Normal.   Neurological Normal Memory - Normal. Cranial nerves - Cranial nerves II through XII grossly intact.   Head/Face Normal Facial features - Normal. Eyebrows - Normal. Skull - Normal.        Nasopharynx Normal External nose - Normal.  Lips/teeth/gums - Normal. Tonsils - Normal. Oropharynx - Normal.   Ears Normal Inspection - Right: Normal, Left: Normal. Canal - Right: Normal, Left: Normal. TM - Right: Normal, Left: Normal.   Skin Normal Inspection - Normal.        Lymph Detail Normal Submental. Submandibular. Anterior cervical. Posterior cervical. Supraclavicular.        Nose/Mouth/Throat Normal External nose - Normal. Lips/teeth/gums - Normal. Tonsils - Normal. Oropharynx - Normal.   Nose/Mouth/Throat Normal Nares - Right: Normal Left: Normal. Septum -Normal  Turbinates - Right: Normal, Left: Normal.       Current Outpatient Medications:     predniSONE 10 MG Oral Tab, 6 tablets daily day one through 5, 4 tablets daily on days  6 and 7, 2 tablets daily on days 8 and 9, One tablet daily on days 10 and 11., Disp: 44 tablet, Rfl: 0    Aspirin-Acetaminophen-Caffeine (EXCEDRIN OR), Take by mouth., Disp: , Rfl:     Multiple Vitamins-Minerals (CENTRUM SILVER 50+MEN OR), Take by mouth., Disp: , Rfl:   ASSESSMENT AND PLAN    1. Tinnitus of both ears  Asymmetric moderate hearing loss on the right at the highest frequencies mild loss on the left.  Unclear as to how long this has asymmetry but he is only been symptomatic with his imbalance and unsteadiness for about 9 days.  We did discuss the value of prednisone burst and taper and he wishes to proceed.  Return to see me in 2 weeks with repeat audiogram.  - Audiology Referral - In Network        This note was prepared using Dragon Medical voice recognition dictation software. As a result errors may occur. When identified these errors have been corrected. While every attempt is made to correct errors during dictation discrepancies may still exist    Wesley Peña MD    7/6/2024    8:05 AM

## 2024-07-10 ENCOUNTER — PATIENT MESSAGE (OUTPATIENT)
Dept: OTOLARYNGOLOGY | Facility: CLINIC | Age: 59
End: 2024-07-10

## 2024-07-10 NOTE — TELEPHONE ENCOUNTER
From: Abdulkadir Butts  To: Wesley Peña  Sent: 7/10/2024 12:02 PM CDT  Subject: Prednisone    I’m hoping that there is an alternative medical prescription that I can take my retina specialist Dr. Denis Potter of [place] prefers that I do not take the steroid medicine. If there is something else I can take please advise. I have not begun any of this medical treatment yet.

## 2024-07-10 NOTE — TELEPHONE ENCOUNTER
Abdulkadir Harris said his Retina specialist doesn't want him to take the Prednisone, do you have another suggestion? He hasn't started it yet. Please advise.

## 2024-07-15 RX ORDER — VALACYCLOVIR HYDROCHLORIDE 500 MG/1
500 TABLET, FILM COATED ORAL EVERY 8 HOURS
Qty: 21 TABLET | Refills: 0 | Status: SHIPPED | OUTPATIENT
Start: 2024-07-15 | End: 2024-07-22

## 2024-07-15 NOTE — TELEPHONE ENCOUNTER
Valtrex 500 mg p.o. 3 times daily called into his pharmacy.  There is no alternative to prednisone unfortunately.

## 2024-07-29 ENCOUNTER — PATIENT MESSAGE (OUTPATIENT)
Dept: FAMILY MEDICINE CLINIC | Facility: CLINIC | Age: 59
End: 2024-07-29

## 2024-07-30 ENCOUNTER — OFFICE VISIT (OUTPATIENT)
Dept: AUDIOLOGY | Facility: CLINIC | Age: 59
End: 2024-07-30

## 2024-07-30 ENCOUNTER — OFFICE VISIT (OUTPATIENT)
Dept: OTOLARYNGOLOGY | Facility: CLINIC | Age: 59
End: 2024-07-30

## 2024-07-30 DIAGNOSIS — R26.89 IMBALANCE: ICD-10-CM

## 2024-07-30 DIAGNOSIS — H93.19 TINNITUS, UNSPECIFIED LATERALITY: Primary | ICD-10-CM

## 2024-07-30 DIAGNOSIS — H90.3 ASYMMETRIC SNHL (SENSORINEURAL HEARING LOSS): Primary | ICD-10-CM

## 2024-07-30 PROCEDURE — 92552 PURE TONE AUDIOMETRY AIR: CPT | Performed by: AUDIOLOGIST

## 2024-07-30 PROCEDURE — 99213 OFFICE O/P EST LOW 20 MIN: CPT | Performed by: OTOLARYNGOLOGY

## 2024-07-30 RX ORDER — CELECOXIB 200 MG/1
200 CAPSULE ORAL DAILY PRN
Qty: 30 CAPSULE | Refills: 0 | Status: SHIPPED | OUTPATIENT
Start: 2024-07-30 | End: 2024-08-29

## 2024-07-30 RX ORDER — CYCLOBENZAPRINE HCL 5 MG
5 TABLET ORAL NIGHTLY
Qty: 30 TABLET | Refills: 1 | Status: SHIPPED | OUTPATIENT
Start: 2024-07-30

## 2024-07-30 NOTE — PROGRESS NOTES
Abdulkadir Butts is a 58 year old male.    Chief Complaint   Patient presents with    Follow - Up     Patient is here due to ringing in ears follow up, with repeat audiogram        HISTORY OF PRESENT ILLNESS  He presents with sudden onset of feeling unsteady about 9 days ago.  States that he woke up he felt a little bit off cannot really explain exactly what he fall but he denies any motion specifically denying any spinning vertigo or imbalance.  States that he drank a lot of water left the house to go to work and went down a staircase and truly felt unsteady at that time.  Symptoms resolved within an hour.  2 days later similar episodes occurred and has not happened since then in the past week.  No other otologic signs or symptoms.  Hearing test was performed today based on his symptoms..  I did review the study and interpreted the results and went over them with him.  He has normal hearing at the low and mid frequencies bilaterally with slight asymmetry in hearing loss at about 4000 Hz on the right which is moderate and mild on the left.  Upsloping to normal after that.  Normal tympanograms and speech discrimination scores.  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  Exposed to very loud noise consistently but wears plugs and earmuffs.  States that about a week ago he started having louder tinnitus in both ears.  Unclear if he has had the tinnitus before but he just notes that it is much louder now.  Sent by Dr. Weiler for my opinion regarding his symptoms.     7/30/24 he presents today having used Valtrex for about 7 to 10 days.  Does not feel any change in his hearing has bitemporal headaches as well as imbalance that can last for hours at times.  When he walks he feels like the horizon moves along with him.  Previous audiogram demonstrated asymmetric hearing loss at about 4000 Hz with the right being worse than the left.  No change in hearing on today's audiogram which I did  personally review and interpret this and went over the results with the patient in the office today.  He does have a history of grinding his teeth and started using an old bite guard which she states is worn down.  Headaches have worsened since using the bite guard.      Social History     Socioeconomic History    Marital status:    Tobacco Use    Smoking status: Some Days     Current packs/day: 0.00     Types: Cigarettes     Start date: 3/5/2019     Last attempt to quit: 3/5/2021     Years since quitting: 3.4     Passive exposure: Never    Smokeless tobacco: Never    Tobacco comments:     Few times a week   Vaping Use    Vaping status: Never Used   Substance and Sexual Activity    Alcohol use: Yes     Alcohol/week: 12.0 standard drinks of alcohol     Types: 12 Cans of beer per week     Comment: Socially    Drug use: Never       Family History   Problem Relation Age of Onset    Heart Disorder Mother     Heart Disorder Maternal Grandmother     Diabetes Neg     Glaucoma Neg     Macular degeneration Neg        Past Medical History:    Central serous chorioretinopathy of eye, right    Seen by Dr. Potter    Macular edema- right eye    Screen for colon cancer    repeat CLN in 10 years       Past Surgical History:   Procedure Laterality Date    Colonoscopy  02/05/2014    Other surgical history      bilateral knee- meniscus repair         REVIEW OF SYSTEMS    System Neg/Pos Details   Constitutional Negative Fatigue, fever and weight loss.   ENMT Negative Drooling.   Eyes Negative Blurred vision and vision changes.   Respiratory Negative Dyspnea and wheezing.   Cardio Negative Chest pain, irregular heartbeat/palpitations and syncope.   GI Negative Abdominal pain and diarrhea.   Endocrine Negative Cold intolerance and heat intolerance.   Neuro Negative Tremors.   Psych Negative Anxiety and depression.   Integumentary Negative Frequent skin infections, pigment change and rash.   Hema/Lymph Negative Easy bleeding and  easy bruising.           PHYSICAL EXAM    There were no vitals taken for this visit.       Constitutional Normal Overall appearance - Normal.   Psychiatric Normal Orientation - Oriented to time, place, person & situation. Appropriate mood and affect.   Neck Exam Normal Inspection - Normal. Palpation - Normal. Parotid gland - Normal. Thyroid gland - Normal.   Eyes Normal Conjunctiva - Right: Normal, Left: Normal. Pupil - Right: Normal, Left: Normal. Fundus - Right: Normal, Left: Normal.   Neurological Normal Memory - Normal. Cranial nerves - Cranial nerves II through XII grossly intact.   Head/Face Normal Facial features - Normal. Eyebrows - Normal. Skull - Normal.        Nasopharynx Normal External nose - Normal. Lips/teeth/gums - Normal. Tonsils - Normal. Oropharynx - Normal.   Ears Normal Inspection - Right: Normal, Left: Normal. Canal - Right: Normal, Left: Normal. TM - Right: Normal, Left: Normal.   Skin Normal Inspection - Normal.        Lymph Detail Normal Submental. Submandibular. Anterior cervical. Posterior cervical. Supraclavicular.        Nose/Mouth/Throat Normal External nose - Normal. Lips/teeth/gums - Normal. Tonsils - Normal. Oropharynx - Normal.   Nose/Mouth/Throat Normal Nares - Right: Normal Left: Normal. Septum -Normal  Turbinates - Right: Normal, Left: Normal.       Current Outpatient Medications:     Aspirin-Acetaminophen-Caffeine (EXCEDRIN OR), Take by mouth., Disp: , Rfl:     Multiple Vitamins-Minerals (CENTRUM SILVER 50+MEN OR), Take by mouth., Disp: , Rfl:     predniSONE 10 MG Oral Tab, 6 tablets daily day one through 5, 4 tablets daily on days  6 and 7, 2 tablets daily on days 8 and 9, One tablet daily on days 10 and 11., Disp: 44 tablet, Rfl: 0  ASSESSMENT AND PLAN    1. Tinnitus, unspecified laterality  - Audiology Referral - [place] ([facility])    2. Imbalance  No change in his hearing.  Continued moderate hearing loss on the right compared to the left which is mild.  We  discussed the fact that some of his headaches bitemporally and this may be musculoskeletal in nature as he does have issues with his jaw joint from grinding.  He has been using bite guard and I did recommend that he consider getting a new one through his dentist.  In addition I will call in Celebrex cyclobenzaprine and he will start warm heat soft diet and avoid crunchy items.  Return to see me in 1 month for reevaluation repeat audiogram in 6 months.        This note was prepared using Dragon Medical voice recognition dictation software. As a result errors may occur. When identified these errors have been corrected. While every attempt is made to correct errors during dictation discrepancies may still exist    Wesley Peña MD    7/30/2024    2:44 PM

## 2024-08-28 RX ORDER — CYCLOBENZAPRINE HCL 5 MG
5 TABLET ORAL NIGHTLY
Qty: 30 TABLET | Refills: 0 | Status: SHIPPED | OUTPATIENT
Start: 2024-08-28

## 2024-08-28 RX ORDER — CELECOXIB 200 MG/1
200 CAPSULE ORAL DAILY PRN
Qty: 30 CAPSULE | Refills: 0 | OUTPATIENT
Start: 2024-08-28

## 2024-08-28 RX ORDER — CELECOXIB 200 MG/1
200 CAPSULE ORAL DAILY PRN
Qty: 30 CAPSULE | Refills: 0 | Status: SHIPPED | OUTPATIENT
Start: 2024-08-28 | End: 2024-09-27

## 2024-09-23 ENCOUNTER — OFFICE VISIT (OUTPATIENT)
Dept: OTOLARYNGOLOGY | Facility: CLINIC | Age: 59
End: 2024-09-23

## 2024-09-23 DIAGNOSIS — R26.89 IMBALANCE: ICD-10-CM

## 2024-09-23 DIAGNOSIS — H93.13 TINNITUS OF BOTH EARS: Primary | ICD-10-CM

## 2024-09-23 PROCEDURE — 99213 OFFICE O/P EST LOW 20 MIN: CPT | Performed by: OTOLARYNGOLOGY

## 2024-09-23 RX ORDER — CELECOXIB 200 MG/1
200 CAPSULE ORAL DAILY PRN
Qty: 30 CAPSULE | Refills: 0 | Status: SHIPPED | OUTPATIENT
Start: 2024-09-23 | End: 2024-10-23

## 2024-09-23 RX ORDER — CYCLOBENZAPRINE HCL 5 MG
5 TABLET ORAL NIGHTLY
Qty: 30 TABLET | Refills: 0 | Status: SHIPPED | OUTPATIENT
Start: 2024-09-23

## 2024-09-23 NOTE — PROGRESS NOTES
Abdulkadir Butts is a 58 year old male.    Chief Complaint   Patient presents with    Follow - Up     X 1 month reevaluation, reports imbalance has resolved.        HISTORY OF PRESENT ILLNESS  He presents with sudden onset of feeling unsteady about 9 days ago.  States that he woke up he felt a little bit off cannot really explain exactly what he fall but he denies any motion specifically denying any spinning vertigo or imbalance.  States that he drank a lot of water left the house to go to work and went down a staircase and truly felt unsteady at that time.  Symptoms resolved within an hour.  2 days later similar episodes occurred and has not happened since then in the past week.  No other otologic signs or symptoms.  Hearing test was performed today based on his symptoms..  I did review the study and interpreted the results and went over them with him.  He has normal hearing at the low and mid frequencies bilaterally with slight asymmetry in hearing loss at about 4000 Hz on the right which is moderate and mild on the left.  Upsloping to normal after that.  Normal tympanograms and speech discrimination scores.  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  Exposed to very loud noise consistently but wears plugs and earmuffs.  States that about a week ago he started having louder tinnitus in both ears.  Unclear if he has had the tinnitus before but he just notes that it is much louder now.  Sent by Dr. Weiler for my opinion regarding his symptoms.     7/30/24 he presents today having used Valtrex for about 7 to 10 days.  Does not feel any change in his hearing has bitemporal headaches as well as imbalance that can last for hours at times.  When he walks he feels like the horizon moves along with him.  Previous audiogram demonstrated asymmetric hearing loss at about 4000 Hz with the right being worse than the left.  No change in hearing on today's audiogram which I did personally review and  interpret this and went over the results with the patient in the office today.  He does have a history of grinding his teeth and started using an old bite guard which she states is worn down.  Headaches have worsened since using the bite guard.      9/23/24 I last saw him he has had complete resolution of his bitemporal headaches and the imbalance is gone completely.  States that he rarely senses any real sense of discomfort and has been using his bite guard consistently every day.  The sensation of oscillopsia is also improved completely where he does not feel like the horizon is moving with him when he walks.  He does have a history of an asymmetric sensory hearing loss at about 4000 Hz with the right side being worse than the left.  Using Celebrex and cyclobenzaprine as needed and very rarely and he does follow a soft diet and use of warm heat to prevent problems.      Social History     Socioeconomic History    Marital status:    Tobacco Use    Smoking status: Some Days     Current packs/day: 0.00     Types: Cigarettes     Start date: 3/5/2019     Last attempt to quit: 3/5/2021     Years since quitting: 3.5     Passive exposure: Never    Smokeless tobacco: Never    Tobacco comments:     Few times a week   Vaping Use    Vaping status: Never Used   Substance and Sexual Activity    Alcohol use: Yes     Alcohol/week: 12.0 standard drinks of alcohol     Types: 12 Cans of beer per week     Comment: Socially    Drug use: Never       Family History   Problem Relation Age of Onset    Heart Disorder Mother     Heart Disorder Maternal Grandmother     Diabetes Neg     Glaucoma Neg     Macular degeneration Neg        Past Medical History:    Central serous chorioretinopathy of eye, right    Seen by Dr. Potter    Macular edema- right eye    Screen for colon cancer    repeat CLN in 10 years       Past Surgical History:   Procedure Laterality Date    Colonoscopy  02/05/2014    Other surgical history      bilateral  knee- meniscus repair         REVIEW OF SYSTEMS    System Neg/Pos Details   Constitutional Negative Fatigue, fever and weight loss.   ENMT Negative Drooling.   Eyes Negative Blurred vision and vision changes.   Respiratory Negative Dyspnea and wheezing.   Cardio Negative Chest pain, irregular heartbeat/palpitations and syncope.   GI Negative Abdominal pain and diarrhea.   Endocrine Negative Cold intolerance and heat intolerance.   Neuro Negative Tremors.   Psych Negative Anxiety and depression.   Integumentary Negative Frequent skin infections, pigment change and rash.   Hema/Lymph Negative Easy bleeding and easy bruising.           PHYSICAL EXAM    There were no vitals taken for this visit.       Constitutional Normal Overall appearance - Normal.   Psychiatric Normal Orientation - Oriented to time, place, person & situation. Appropriate mood and affect.   Neck Exam Normal Inspection - Normal. Palpation - Normal. Parotid gland - Normal. Thyroid gland - Normal.   Eyes Normal Conjunctiva - Right: Normal, Left: Normal. Pupil - Right: Normal, Left: Normal. Fundus - Right: Normal, Left: Normal.   Neurological Normal Memory - Normal. Cranial nerves - Cranial nerves II through XII grossly intact.   Head/Face Normal Facial features - Normal. Eyebrows - Normal. Skull - Normal.        Nasopharynx Normal External nose - Normal. Lips/teeth/gums - Normal. Tonsils - Normal. Oropharynx - Normal.   Ears Normal Inspection - Right: Normal, Left: Normal. Canal - Right: Normal, Left: Normal. TM - Right: Normal, Left: Normal.   Skin Normal Inspection - Normal.        Lymph Detail Normal Submental. Submandibular. Anterior cervical. Posterior cervical. Supraclavicular.        Nose/Mouth/Throat Normal External nose - Normal. Lips/teeth/gums - Normal. Tonsils - Normal. Oropharynx - Normal.   Nose/Mouth/Throat Normal Nares - Right: Normal Left: Normal. Septum -Normal  Turbinates - Right: Normal, Left: Normal.       Current Outpatient  Medications:     cyclobenzaprine 5 MG Oral Tab, Take 1 tablet (5 mg total) by mouth nightly., Disp: 30 tablet, Rfl: 0    celecoxib 200 MG Oral Cap, Take 1 capsule (200 mg total) by mouth daily as needed for Pain., Disp: 30 capsule, Rfl: 0    Aspirin-Acetaminophen-Caffeine (EXCEDRIN OR), Take by mouth., Disp: , Rfl:     Multiple Vitamins-Minerals (CENTRUM SILVER 50+MEN OR), Take by mouth., Disp: , Rfl:     predniSONE 10 MG Oral Tab, 6 tablets daily day one through 5, 4 tablets daily on days  6 and 7, 2 tablets daily on days 8 and 9, One tablet daily on days 10 and 11., Disp: 44 tablet, Rfl: 0  ASSESSMENT AND PLAN    1. Tinnitus of both ears    2. Imbalance  Essentially complete resolution of his symptoms except for tinnitus which I suspect is simply due to his underlying hearing loss at 4000 Hz with the right being worse than the left.  Continue bite guard I will call in some more cyclobenzaprine and Celebrex for him to have to use on a as needed basis.  Return to see me in 5 months for repeat audiogram at our [place] office.  He understands that if worse hearing noted on the right side compared to the left I will most likely recommend an MRI of the brain and IACs to rule out an intracranial or retrocochlear process.        This note was prepared using Dragon Medical voice recognition dictation software. As a result errors may occur. When identified these errors have been corrected. While every attempt is made to correct errors during dictation discrepancies may still exist    Wesley Peña MD    9/23/2024    4:51 PM

## 2024-12-30 ENCOUNTER — PATIENT MESSAGE (OUTPATIENT)
Dept: OTOLARYNGOLOGY | Facility: CLINIC | Age: 59
End: 2024-12-30

## 2025-01-04 ENCOUNTER — OFFICE VISIT (OUTPATIENT)
Dept: OTOLARYNGOLOGY | Facility: CLINIC | Age: 60
End: 2025-01-04
Payer: COMMERCIAL

## 2025-01-04 ENCOUNTER — OFFICE VISIT (OUTPATIENT)
Dept: AUDIOLOGY | Facility: CLINIC | Age: 60
End: 2025-01-04

## 2025-01-04 VITALS — WEIGHT: 178 LBS | HEIGHT: 67 IN | BODY MASS INDEX: 27.94 KG/M2

## 2025-01-04 DIAGNOSIS — H93.19 TINNITUS, UNSPECIFIED LATERALITY: Primary | ICD-10-CM

## 2025-01-04 DIAGNOSIS — H93.13 TINNITUS OF BOTH EARS: Primary | ICD-10-CM

## 2025-01-04 DIAGNOSIS — H93.13 TINNITUS, BILATERAL: ICD-10-CM

## 2025-01-04 PROCEDURE — 99214 OFFICE O/P EST MOD 30 MIN: CPT | Performed by: OTOLARYNGOLOGY

## 2025-01-04 PROCEDURE — 3008F BODY MASS INDEX DOCD: CPT | Performed by: OTOLARYNGOLOGY

## 2025-01-04 PROCEDURE — 92557 COMPREHENSIVE HEARING TEST: CPT | Performed by: AUDIOLOGIST

## 2025-01-04 PROCEDURE — 92567 TYMPANOMETRY: CPT | Performed by: AUDIOLOGIST

## 2025-01-05 NOTE — PROGRESS NOTES
Abdulkadir Butts is a 59 year old male.    Chief Complaint   Patient presents with    Follow - Up     Patient here for ringing of both ears f/up, reports ringing getting worst.       HISTORY OF PRESENT ILLNESS  He presents with sudden onset of feeling unsteady about 9 days ago.  States that he woke up he felt a little bit off cannot really explain exactly what he fall but he denies any motion specifically denying any spinning vertigo or imbalance.  States that he drank a lot of water left the house to go to work and went down a staircase and truly felt unsteady at that time.  Symptoms resolved within an hour.  2 days later similar episodes occurred and has not happened since then in the past week.  No other otologic signs or symptoms.  Hearing test was performed today based on his symptoms..  I did review the study and interpreted the results and went over them with him.  He has normal hearing at the low and mid frequencies bilaterally with slight asymmetry in hearing loss at about 4000 Hz on the right which is moderate and mild on the left.  Upsloping to normal after that.  Normal tympanograms and speech discrimination scores.  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  Exposed to very loud noise consistently but wears plugs and earmuffs.  States that about a week ago he started having louder tinnitus in both ears.  Unclear if he has had the tinnitus before but he just notes that it is much louder now.  Sent by Dr. Weiler for my opinion regarding his symptoms.     7/30/24 he presents today having used Valtrex for about 7 to 10 days.  Does not feel any change in his hearing has bitemporal headaches as well as imbalance that can last for hours at times.  When he walks he feels like the horizon moves along with him.  Previous audiogram demonstrated asymmetric hearing loss at about 4000 Hz with the right being worse than the left.  No change in hearing on today's audiogram which I did  personally review and interpret this and went over the results with the patient in the office today.  He does have a history of grinding his teeth and started using an old bite guard which she states is worn down.  Headaches have worsened since using the bite guard.      9/23/24 I last saw him he has had complete resolution of his bitemporal headaches and the imbalance is gone completely.  States that he rarely senses any real sense of discomfort and has been using his bite guard consistently every day.  The sensation of oscillopsia is also improved completely where he does not feel like the horizon is moving with him when he walks.  He does have a history of an asymmetric sensory hearing loss at about 4000 Hz with the right side being worse than the left.  Using Celebrex and cyclobenzaprine as needed and very rarely and he does follow a soft diet and use of warm heat to prevent problems.      1/4/25 he presents with a history of sudden onset hearing loss earlier last year in 2024 with audiogram demonstrating asymmetric hearing loss at the higher frequencies.  He now presents for repeat audiogram to watch for stability in his hearing loss.  Today's audiogram was reviewed by me as well as his previous study from last year and I specifically reviewed the results and interpreted the results and went over the results with him in the office today.  This demonstrates unchanged high-frequency moderate sensorineural hearing loss on the right with unchanged mild sensory hearing loss on the left.  He does note that his ringing is slightly worse specifically on the right side also complains of continued pressure on that right side.  I have suspected TMJ issues in the past.  Has been using masking techniques with the use of an over-the-counter sound generator but states that the sounds created often and will actually prevent him from sleeping well.  Tries to moderate any stress or anxiety in his life.  Also tries to have a  well-balanced diet.  He has looked into using some OTC medications for tinnitus and has many questions regarding their use.      Social History     Socioeconomic History    Marital status:    Tobacco Use    Smoking status: Some Days     Current packs/day: 0.00     Types: Cigarettes     Start date: 3/5/2019     Last attempt to quit: 3/5/2021     Years since quitting: 3.8     Passive exposure: Never    Smokeless tobacco: Never    Tobacco comments:     Few times a week   Vaping Use    Vaping status: Never Used   Substance and Sexual Activity    Alcohol use: Yes     Alcohol/week: 12.0 standard drinks of alcohol     Types: 12 Cans of beer per week     Comment: Socially    Drug use: Never       Family History   Problem Relation Age of Onset    Heart Disorder Mother     Heart Disorder Maternal Grandmother     Diabetes Neg     Glaucoma Neg     Macular degeneration Neg        Past Medical History:    Central serous chorioretinopathy of eye, right    Seen by Dr. Potter    Macular edema- right eye    Screen for colon cancer    repeat CLN in 10 years       Past Surgical History:   Procedure Laterality Date    Colonoscopy  02/05/2014    Other surgical history      bilateral knee- meniscus repair         REVIEW OF SYSTEMS    System Neg/Pos Details   Constitutional Negative Fatigue, fever and weight loss.   ENMT Negative Drooling.   Eyes Negative Blurred vision and vision changes.   Respiratory Negative Dyspnea and wheezing.   Cardio Negative Chest pain, irregular heartbeat/palpitations and syncope.   GI Negative Abdominal pain and diarrhea.   Endocrine Negative Cold intolerance and heat intolerance.   Neuro Negative Tremors.   Psych Negative Anxiety and depression.   Integumentary Negative Frequent skin infections, pigment change and rash.   Hema/Lymph Negative Easy bleeding and easy bruising.           PHYSICAL EXAM    Ht 5' 7\" (1.702 m)   Wt 178 lb (80.7 kg)   BMI 27.88 kg/m²        Constitutional Normal Overall  appearance - Normal.   Psychiatric Normal Orientation - Oriented to time, place, person & situation. Appropriate mood and affect.   Neck Exam Normal Inspection - Normal. Palpation - Normal. Parotid gland - Normal. Thyroid gland - Normal.   Eyes Normal Conjunctiva - Right: Normal, Left: Normal. Pupil - Right: Normal, Left: Normal. Fundus - Right: Normal, Left: Normal.   Neurological Normal Memory - Normal. Cranial nerves - Cranial nerves II through XII grossly intact.   Head/Face Normal Facial features - Normal. Eyebrows - Normal. Skull - Normal.        Nasopharynx Normal External nose - Normal. Lips/teeth/gums - Normal. Tonsils - Normal. Oropharynx - Normal.   Ears Normal Inspection - Right: Normal, Left: Normal. Canal - Right: Normal, Left: Normal. TM - Right: Normal, Left: Normal.   Skin Normal Inspection - Normal.        Lymph Detail Normal Submental. Submandibular. Anterior cervical. Posterior cervical. Supraclavicular.        Nose/Mouth/Throat Normal External nose - Normal. Lips/teeth/gums - Normal. Tonsils - Normal. Oropharynx - Normal.   Nose/Mouth/Throat Normal Nares - Right: Normal Left: Normal. Septum -Normal  Turbinates - Right: Normal, Left: Normal.       Current Outpatient Medications:     cyclobenzaprine 5 MG Oral Tab, Take 1 tablet (5 mg total) by mouth nightly., Disp: 30 tablet, Rfl: 0    Multiple Vitamins-Minerals (CENTRUM SILVER 50+MEN OR), Take by mouth., Disp: , Rfl:     predniSONE 10 MG Oral Tab, 6 tablets daily day one through 5, 4 tablets daily on days  6 and 7, 2 tablets daily on days 8 and 9, One tablet daily on days 10 and 11., Disp: 44 tablet, Rfl: 0    Aspirin-Acetaminophen-Caffeine (EXCEDRIN OR), Take by mouth., Disp: , Rfl:   ASSESSMENT AND PLAN    1. Tinnitus of both ears  Slightly worsening tinnitus particularly on the right side but having hearing issues bilaterally and tinnitus.  Audiogram performed today demonstrates essentially stable hearing with a most some mild hearing loss on  the left at the highest frequencies and a more moderate loss on the right side at the higher frequencies as well.  Speech examination scores are 100%.  Normal tympanograms and middle ear pressures.  We had a very detailed conversation regarding the fact that at this time I would not recommend an MRI of the brain IACs as she has had no progression of his hearing loss on the right compared to last year.  In addition we discussed management of his tinnitus with masking techniques.  His wife has purchased an OTC sound machine but this seems to actually keep him awake.  We discussed use of over-the-counter supplements such as Lipo flavonoids and other items we specifically discussed the fact that these have been improving to help with ameliorating tinnitus that is been present for some time.  We did discuss the need for him to limit stress anxiety and to maintain a healthy diet with moderation in terms of use of alcohol salt and caffeine.  In addition we discussed the need for him to maintain good sleep habits to prevent exacerbation of his tinnitus.  We also discussed his persistent ear pressure issues and in the past I have suspected him to have issues with TMJ problems.  We did discuss that this pressure sensation may be related to the underlying issues with his inner ear which is suspect to be viral in etiology versus a possible musculoskeletal process such as TMJ inflammation.  Greater than 30 minutes were spent with the patient and at this time I have recommended that he return to see me in 1 year for repeat audiogram.  - Audiology Referral - In Network        This note was prepared using Dragon Medical voice recognition dictation software. As a result errors may occur. When identified these errors have been corrected. While every attempt is made to correct errors during dictation discrepancies may still exist    Wesley Peña MD    1/5/2025    7:39 AM

## 2025-02-08 ENCOUNTER — PATIENT MESSAGE (OUTPATIENT)
Dept: FAMILY MEDICINE CLINIC | Facility: CLINIC | Age: 60
End: 2025-02-08

## 2025-02-08 DIAGNOSIS — M79.672 LEFT FOOT PAIN: Primary | ICD-10-CM

## 2025-03-04 ENCOUNTER — OFFICE VISIT (OUTPATIENT)
Dept: PODIATRY CLINIC | Facility: CLINIC | Age: 60
End: 2025-03-04

## 2025-03-04 DIAGNOSIS — M21.622 TAILOR'S BUNION OF LEFT FOOT: Primary | ICD-10-CM

## 2025-03-04 DIAGNOSIS — M79.672 LEFT FOOT PAIN: ICD-10-CM

## 2025-03-04 DIAGNOSIS — L84 FOOT CALLUS: ICD-10-CM

## 2025-03-04 NOTE — PROGRESS NOTES
[facility] Podiatry  Progress Note      Abdulkadir Butts is a 59 year old male.   Chief Complaint   Patient presents with    Foot Pain     Left - has a callus which is very painful for the past month - rates pain as 6/10 when he walks - feels like a stabbing pain              HPI:     Patient is a pleasant 59-year-old male presents to clinic for evaluation of a painful callus on the plantar aspect of the left fifth metatarsal.  He admits to debriding it every other day.  Admits to pain with pressure.  He spends prolonged time walking and standing.  Denies any pedal injuries or trauma.  Denies any signs of infection.  Allergies: Penicillamine and Penicillins    Current Outpatient Medications   Medication Sig Dispense Refill    cyclobenzaprine 5 MG Oral Tab Take 1 tablet (5 mg total) by mouth nightly. 30 tablet 0    predniSONE 10 MG Oral Tab 6 tablets daily day one through 5, 4 tablets daily on days  6 and 7, 2 tablets daily on days 8 and 9, One tablet daily on days 10 and 11. 44 tablet 0    Aspirin-Acetaminophen-Caffeine (EXCEDRIN OR) Take by mouth.      Multiple Vitamins-Minerals (CENTRUM SILVER 50+MEN OR) Take by mouth.        Past Medical History:    Central serous chorioretinopathy of eye, right    Seen by Dr. Potter    Macular edema- right eye    Screen for colon cancer    repeat CLN in 10 years      Past Surgical History:   Procedure Laterality Date    Colonoscopy  2014    Other surgical history      bilateral knee- meniscus repair      Family History   Problem Relation Age of Onset    Heart Disorder Mother     Heart Disorder Maternal Grandmother     Diabetes Neg     Glaucoma Neg     Macular degeneration Neg       Social History     Socioeconomic History    Marital status:    Tobacco Use    Smoking status: Some Days     Current packs/day: 0.00     Types: Cigarettes     Start date: 3/5/2019     Last attempt to quit: 3/5/2021     Years since quittin.0     Passive exposure: Never    Smokeless  tobacco: Never    Tobacco comments:     Few times a week   Vaping Use    Vaping status: Never Used   Substance and Sexual Activity    Alcohol use: Yes     Alcohol/week: 12.0 standard drinks of alcohol     Types: 12 Cans of beer per week     Comment: Socially    Drug use: Never           REVIEW OF SYSTEMS:     Denies nause, fever, chills  No calf pain  Denies chest pain or SOB      EXAM:   There were no vitals taken for this visit.  GENERAL: well developed, well nourished, in no apparent distress  EXTREMITIES:   1. Integument: Normal skin temperature and turgor.  Hyperkeratotic tissue on the plantar aspect of the left fifth metatarsal.  No signs of infection noted to left foot.    2. Vascular: Dorsalis pedis two out of four bilateral and posterior tibial pulses two out of   four bilateral, capillary refill normal.   3. Musculoskeletal: pain with palpation to left foot HPK    4. Neurological: Normal sharp dull sensation; reflexes normal.             ASSESSMENT AND PLAN:   Diagnoses and all orders for this visit:    Juan's bunion of left foot    Foot callus    Left foot pain        Plan:     Patient seen and examined and findings discussed with patient.  Discussed etiology of condition along with treatment options.  Using a sterile #15 blade the left foot callus was debrided down to healthy skin.  Advised patient to only debride the callus once a week.  Dispensed felt pads.  Advised patient to use over-the-counter cushion inserts and shoes.  Avoid walking barefoot.  We also discussed the possibility of surgical intervention if all conservative treatment fails.  Advised patient to follow-up in clinic in 2 months.    The patient indicates understanding of these issues and agrees to the plan.        Nadira Bañuelos DPM

## 2025-03-31 ENCOUNTER — PATIENT MESSAGE (OUTPATIENT)
Dept: FAMILY MEDICINE CLINIC | Facility: CLINIC | Age: 60
End: 2025-03-31

## 2025-04-02 ENCOUNTER — OFFICE VISIT (OUTPATIENT)
Dept: FAMILY MEDICINE CLINIC | Facility: CLINIC | Age: 60
End: 2025-04-02
Payer: COMMERCIAL

## 2025-04-02 ENCOUNTER — HOSPITAL ENCOUNTER (OUTPATIENT)
Dept: GENERAL RADIOLOGY | Age: 60
Discharge: HOME | End: 2025-04-02
Attending: FAMILY MEDICINE
Payer: COMMERCIAL

## 2025-04-02 VITALS
SYSTOLIC BLOOD PRESSURE: 149 MMHG | WEIGHT: 175 LBS | HEART RATE: 61 BPM | DIASTOLIC BLOOD PRESSURE: 80 MMHG | BODY MASS INDEX: 27 KG/M2 | TEMPERATURE: 98 F | RESPIRATION RATE: 16 BRPM

## 2025-04-02 DIAGNOSIS — R43.2 ABNORMAL SENSE OF TASTE: ICD-10-CM

## 2025-04-02 DIAGNOSIS — M54.41 ACUTE MIDLINE LOW BACK PAIN WITH RIGHT-SIDED SCIATICA: Primary | ICD-10-CM

## 2025-04-02 DIAGNOSIS — M54.41 ACUTE MIDLINE LOW BACK PAIN WITH RIGHT-SIDED SCIATICA: ICD-10-CM

## 2025-04-02 DIAGNOSIS — L30.9 ECZEMA, UNSPECIFIED TYPE: ICD-10-CM

## 2025-04-02 PROCEDURE — 99214 OFFICE O/P EST MOD 30 MIN: CPT | Performed by: FAMILY MEDICINE

## 2025-04-02 PROCEDURE — 72100 X-RAY EXAM L-S SPINE 2/3 VWS: CPT | Performed by: FAMILY MEDICINE

## 2025-04-02 PROCEDURE — 3077F SYST BP >= 140 MM HG: CPT | Performed by: FAMILY MEDICINE

## 2025-04-02 PROCEDURE — G2211 COMPLEX E/M VISIT ADD ON: HCPCS | Performed by: FAMILY MEDICINE

## 2025-04-02 PROCEDURE — 3079F DIAST BP 80-89 MM HG: CPT | Performed by: FAMILY MEDICINE

## 2025-04-02 RX ORDER — MELOXICAM 15 MG/1
15 TABLET ORAL DAILY
Qty: 20 TABLET | Refills: 0 | Status: SHIPPED | OUTPATIENT
Start: 2025-04-02 | End: 2025-04-09

## 2025-04-02 RX ORDER — HYDROCORTISONE 25 MG/G
1 CREAM TOPICAL 2 TIMES DAILY
Qty: 30 G | Refills: 1 | Status: SHIPPED | OUTPATIENT
Start: 2025-04-02

## 2025-04-02 NOTE — PROGRESS NOTES
HPI: Abdulkadir is a 59 year old male who presents for multiple concerns.  Pt reports he started to develop lower left back pain.  Came on suddenly. No injury. Started pinching throughout the day. Pain is now in the middle. Taking Ibuprofen which helps. Hurts to bend over and has crepitation. Has history of bulging disc. Had procedure. Pain now wraps around right hip and into groin. Feels like it catches when he turns.     Has tingling to left hand.     Has lost sense of taste and smell for the past few weeks. Sneezing in the mornings. Cannot taste coffee.  Can only taste food about 25%.  Has some congestion in nose. COVID test was normal.     PMH:    Past Medical History:    Central serous chorioretinopathy of eye, right    Seen by Dr. Potter    Macular edema- right eye    Screen for colon cancer    repeat CLN in 10 years      Alg:  Penicillamine and Penicillins   Meds: Medications Ordered Prior to Encounter[1]   Tobacco Use: no    ROS: see HPI    Objective:   Gen: AOx3. NAD.  /76   Pulse 61   Temp 98 °F (36.7 °C) (Temporal)   Resp 16   Wt 175 lb (79.4 kg)   BMI 27.41 kg/m²   HEENT: Conjunctive clear.  Robby ear canals clear.  Robby TMs intact with good landmarks noted.  Nares patent. Moderate hypertrophy noted to  Oral mucous membrane moist.  Normal lips, teeth, and gums.  Oropharynx normal.  Neck supple.  Good ROM.  No LAD.  Thyroid normal.  CV:  Regular rate and rhythm; no murmurs  Lungs:  Clear to ausculation; good aeration               No wheezes, rales or rhonchi  Skin: circular, dry patch noted to left knee      Assessment:/Plan:  Encounter Diagnoses   Name Primary?    Acute midline low back pain with right-sided sciatica    Check xray of lumbar spine. Start Meloxicam.  Advised to take with food. Refer for physical therapy   Yes    Abnormal sense of taste    Refer to ENT for evaluation       Eczema, unspecified type    Start Hydrocortisone      Colleen Weiler, DO           [1]   Current Outpatient  Medications on File Prior to Visit   Medication Sig Dispense Refill    Multiple Vitamins-Minerals (CENTRUM SILVER 50+MEN OR) Take by mouth.       No current facility-administered medications on file prior to visit.

## 2025-04-21 ENCOUNTER — TELEPHONE (OUTPATIENT)
Dept: FAMILY MEDICINE CLINIC | Facility: CLINIC | Age: 60
End: 2025-04-21

## 2025-04-21 NOTE — TELEPHONE ENCOUNTER
Patient called and is asking for physical therapy referral to be faxed over to [facility], please notify when faxed over     FAX: [phone]

## 2025-05-01 RX ORDER — PREDNISONE 10 MG/1
TABLET ORAL
Qty: 44 TABLET | Refills: 0 | Status: CANCELLED | OUTPATIENT
Start: 2025-05-01

## 2025-05-01 NOTE — TELEPHONE ENCOUNTER
Meloxicam pending. Please advise on steroid. Awaiting call regarding PT referral.     Patient was seen on 4/2/25 for back pain and has continued symptoms of numbness and tingling to the left fingers. He is out of Meloxicam and requesting a refill . He has not started physical therapy. He states that [place] is too far for him. He will call an [facility] close to him to see if his insurance will cover it and call us back for a new referral. Patient tried a steroid in the past and was wondering if Dr. Weiler would prescribe that to him.     Abdulkadir Butts to [facility] Rn Triage (supporting Colleen M Weiler, DO)        4/30/25  1:26 PM  I still have tingling and light numbness in my left hand fingertips from time to time I thought that would have gone away by now, but I still have it. I still do have back pain and I am out of meloxicam  for the past six days. I’ve been taking ibuprofens now. I’m not quite sure if I have to come in and perhaps prescribe a different kind of Pain medicine or continue taking meloxicam, which was not a refillable item. At one point in time I was prescribed.Methyprednisolone for pretty much the same issue. I’m having with my back so I take it from here that I wait to hear back from you Thank You

## 2025-05-02 ENCOUNTER — TELEPHONE (OUTPATIENT)
Dept: FAMILY MEDICINE CLINIC | Facility: CLINIC | Age: 60
End: 2025-05-02

## 2025-05-02 DIAGNOSIS — M54.41 ACUTE BACK PAIN WITH SCIATICA, RIGHT: Primary | ICD-10-CM

## 2025-05-02 RX ORDER — METHYLPREDNISOLONE 4 MG/1
TABLET ORAL
Qty: 1 EACH | Refills: 0 | Status: SHIPPED | OUTPATIENT
Start: 2025-05-02

## 2025-05-02 NOTE — TELEPHONE ENCOUNTER
Medrol dose pack sent in which may help.  Take with food.  Do not mix with Meloxicam or Ibuprofen.  May take Tylenol

## 2025-05-02 NOTE — TELEPHONE ENCOUNTER
Pt found PT facility  that is closer, [facility], faxed to [phone], stated [facility] too far to drive

## 2025-05-03 NOTE — TELEPHONE ENCOUNTER
Patient spoke with [facility] and was advised they need approval from Dr. Weiler for physical therapy. Please advise.

## 2025-05-06 ENCOUNTER — TELEPHONE (OUTPATIENT)
Dept: ADMINISTRATIVE | Age: 60
End: 2025-05-06

## 2025-05-06 NOTE — TELEPHONE ENCOUNTER
Pt contacted -  [facility] , location : [facility] Fax #  [phone]  Address :[facility] [address] , [place]  [address]          Anila Gann to Me  RUSTAM      5/6/25  2:09 PM  Good afternoon,     Please update referral to reflect which [facility] location patient will be utilizing for physical therapy as this is required in order to submit request to health plan for review.     Thank you,  Gail ROBERTSON

## 2025-05-30 ENCOUNTER — PATIENT MESSAGE (OUTPATIENT)
Dept: FAMILY MEDICINE CLINIC | Facility: CLINIC | Age: 60
End: 2025-05-30

## 2025-05-30 DIAGNOSIS — M54.42 CHRONIC BILATERAL LOW BACK PAIN WITH SCIATICA, SCIATICA LATERALITY UNSPECIFIED: Primary | ICD-10-CM

## 2025-05-30 DIAGNOSIS — G89.29 CHRONIC BILATERAL LOW BACK PAIN WITH SCIATICA, SCIATICA LATERALITY UNSPECIFIED: Primary | ICD-10-CM

## 2025-05-30 DIAGNOSIS — M54.41 CHRONIC BILATERAL LOW BACK PAIN WITH SCIATICA, SCIATICA LATERALITY UNSPECIFIED: Primary | ICD-10-CM

## 2025-06-01 ENCOUNTER — PATIENT MESSAGE (OUTPATIENT)
Dept: FAMILY MEDICINE CLINIC | Facility: CLINIC | Age: 60
End: 2025-06-01

## 2025-06-01 DIAGNOSIS — G89.29 CHRONIC BILATERAL LOW BACK PAIN WITH SCIATICA, SCIATICA LATERALITY UNSPECIFIED: ICD-10-CM

## 2025-06-01 DIAGNOSIS — M54.41 CHRONIC BILATERAL LOW BACK PAIN WITH SCIATICA, SCIATICA LATERALITY UNSPECIFIED: ICD-10-CM

## 2025-06-01 DIAGNOSIS — M54.42 CHRONIC BILATERAL LOW BACK PAIN WITH SCIATICA, SCIATICA LATERALITY UNSPECIFIED: ICD-10-CM

## 2025-06-01 DIAGNOSIS — M54.41 ACUTE BACK PAIN WITH SCIATICA, RIGHT: Primary | ICD-10-CM

## 2025-06-02 ENCOUNTER — PATIENT MESSAGE (OUTPATIENT)
Dept: OTOLARYNGOLOGY | Facility: CLINIC | Age: 60
End: 2025-06-02

## 2025-06-02 NOTE — TELEPHONE ENCOUNTER
Please advise  Patient has also sent my chart requesting referral to ortho.  See 6/1/25 my chart message

## 2025-06-04 NOTE — TELEPHONE ENCOUNTER
I ordered a MRI and signed the referral to the orthopedic.    Can we help with the PT situation at all?

## 2025-06-06 NOTE — TELEPHONE ENCOUNTER
Dr. Weiler please advise, the patients old doctor retired and he is asking for a new referral. The doctor he asked for is a neurologist. Pended a referral for an ortho doctor, Dr. Cruz, please sign if appropriate. Thank you.

## 2025-06-09 DIAGNOSIS — M54.41 ACUTE RIGHT-SIDED LOW BACK PAIN WITH RIGHT-SIDED SCIATICA: Primary | ICD-10-CM

## 2025-07-02 ENCOUNTER — HOSPITAL ENCOUNTER (OUTPATIENT)
Dept: GENERAL RADIOLOGY | Age: 60
Discharge: HOME | End: 2025-07-02
Attending: FAMILY MEDICINE
Payer: COMMERCIAL

## 2025-07-02 DIAGNOSIS — R20.2 BILATERAL NUMBNESS AND TINGLING OF ARMS AND LEGS: ICD-10-CM

## 2025-07-02 DIAGNOSIS — R20.0 BILATERAL NUMBNESS AND TINGLING OF ARMS AND LEGS: ICD-10-CM

## 2025-07-02 PROCEDURE — 72040 X-RAY EXAM NECK SPINE 2-3 VW: CPT | Performed by: FAMILY MEDICINE

## 2025-07-08 ENCOUNTER — HOSPITAL ENCOUNTER (OUTPATIENT)
Dept: MRI IMAGING | Facility: HOSPITAL | Age: 60
Discharge: HOME | End: 2025-07-08
Attending: FAMILY MEDICINE
Payer: COMMERCIAL

## 2025-07-08 DIAGNOSIS — G89.29 CHRONIC BILATERAL LOW BACK PAIN WITH SCIATICA, SCIATICA LATERALITY UNSPECIFIED: ICD-10-CM

## 2025-07-08 DIAGNOSIS — M54.41 CHRONIC BILATERAL LOW BACK PAIN WITH SCIATICA, SCIATICA LATERALITY UNSPECIFIED: ICD-10-CM

## 2025-07-08 DIAGNOSIS — M54.42 CHRONIC BILATERAL LOW BACK PAIN WITH SCIATICA, SCIATICA LATERALITY UNSPECIFIED: ICD-10-CM

## 2025-07-08 PROCEDURE — 72148 MRI LUMBAR SPINE W/O DYE: CPT | Performed by: FAMILY MEDICINE

## 2025-07-09 ENCOUNTER — PATIENT MESSAGE (OUTPATIENT)
Dept: FAMILY MEDICINE CLINIC | Facility: CLINIC | Age: 60
End: 2025-07-09

## 2025-07-19 ENCOUNTER — RESULTS FOLLOW-UP (OUTPATIENT)
Dept: FAMILY MEDICINE CLINIC | Facility: CLINIC | Age: 60
End: 2025-07-19

## 2025-07-28 ENCOUNTER — OFFICE VISIT (OUTPATIENT)
Dept: PHYSICAL MEDICINE AND REHAB | Facility: CLINIC | Age: 60
End: 2025-07-28
Payer: COMMERCIAL

## 2025-07-28 VITALS — BODY MASS INDEX: 27.47 KG/M2 | HEIGHT: 67 IN | WEIGHT: 175 LBS

## 2025-07-28 DIAGNOSIS — M47.816 LUMBAR FACET ARTHROPATHY: Primary | ICD-10-CM

## 2025-07-28 PROCEDURE — 99204 OFFICE O/P NEW MOD 45 MIN: CPT | Performed by: PHYSICAL MEDICINE & REHABILITATION

## 2025-07-28 PROCEDURE — 3008F BODY MASS INDEX DOCD: CPT | Performed by: PHYSICAL MEDICINE & REHABILITATION

## 2025-07-28 NOTE — PROGRESS NOTES
[facility]  NEW PATIENT EVALUATION    Consultation as a request of Dr. Weiler      HISTORY OF PRESENT ILLNESS:     Chief Complaint   Patient presents with    New Patient     New R handed pt referred by Dr. Weiler presents with R sided low back pain radiating down R hip and groin. Rates pain 5/10.Denies injury. Admits N/T in L hand.  Admits loss of taste and smell, although COVID test was normal. Admits pinching sensation. Worse when bending. Admits use of ibuprofen for pain.  Xray lumbar spine 4/2/25, cervical spine 7/2/25.   MRI lumbar spine 7/8/25.    Patient gives verbal consent to use [facility].       History of Present Illness  Abdulkadir Butts is a 59 year old male with a history of a bulging disc who presents with lower back pain.     Lower back pain began in April after a pinch sensation during a movie. The pain has worsened over time, presenting as soreness in the lower back, aggravated by sitting and relieved by standing. Pain severity has slightly improved recently.    A bulging disc was diagnosed between 2010 and 2018 with similar symptoms. Physical therapy at [facility] was previously attempted but discontinued due to perceived worsening. He manages symptoms by sleeping on his back with a pillow under his knees.    There is no radiation of pain to the legs, bowel or bladder dysfunction, or leg weakness. A past episode of left leg tingling has resolved. He experiences constant tingling in two fingers, believed to be unrelated.    He has not used anti-inflammatory medications recently but has a prescribed steroid pack. Meloxicam was previously ineffective. He underwent radiofrequency ablation for arthritis in 2008, which was beneficial.    He is concerned about lifting heavy objects and running, fearing exacerbation of his condition.       PHYSICAL EXAM:     Ht 67\"   Wt 175 lb (79.4 kg)   BMI 27.41 kg/m²       Gait  Able to toe walk and heel walk without any  difficulty    LUMBAR SPINE:  Inspection: no erythema, swelling, or obvious deformity.  Their iliac crest and shoulder heights are symmetrical.     Palpation: Non tender to palpation of the spinous process. TTP of bilateral lumbar paraspinal muscles, non tender SI joint  ROM: FAROM but pain with extension  Strength: 5/5 in bilateral lower extremities  Sensation: Intact to light touch in all dermatomes of the lower extremities  Reflexes: 2/4 at L4 and S1  Facet Loading: Positive bilateral lower lumbar facet joints  Straight leg raise: negative for radicular pain symptoms  Slump test: negative for pain symptoms for radicular pain symptoms      IMAGING:   MRI lumbar spine completed 7/8/2025 was personally reviewed which is notable for multilevel degenerative disc disease with varying degrees of foraminal stenosis but no significant spinal or foraminal narrowing at any level.  There is degenerative facet arthropathy throughout the lumbar spine    All imaging results were reviewed and discussed with patient.      ASSESSMENT/PLAN:     1. Lumbar facet arthropathy        Assessment & Plan  Arthritis of lumbar facet joints  Chronic lower back pain due to lumbar facet joint arthritis with inflammation. No nerve compression. Previous physical therapy worsened symptoms but will get a second opinion from different location.  - Prescribe steroid pack for inflammation.  - Consider cortisone injections in facet joints if symptoms persist.  - Discuss radiofrequency ablation if cortisone injections are ineffective.  - Advise on exercises to prevent arthritis progression.  - Encourage running if pain-free, stop if pain occurs.    Bulging lumbar discs without nerve compression  Slight bulging of lumbar discs without nerve compression. Not the source of current pain. No radiating pain or neurological deficits.  - Reassured bulging discs are not causing current symptoms.         The patient verbalized understanding with the plan and was  in agreement. All questions/concerns were addressed and there were no barriers to learning.  Please note Dragon dictation software was used to dictate this note and may result in inadvertent typos.    Benitez Luna DO, FAAPMR & CAQSM  Physical Medicine and Rehabilitation  Sports and Spine Medicine    PAST MEDICAL HISTORY:   Past Medical History[1]      PAST SURGICAL HISTORY:   Past Surgical History[2]      CURRENT MEDICATIONS:   Current Medications[3]      ALLERGIES:   Allergies[4]      FAMILY HISTORY:   Family History[5]       SOCIAL HISTORY:   Short Social Hx on File[6]       REVIEW OF SYSTEMS:   No patient-reported data collected this visit.      PHYSICAL EXAM:   General: No immediate distress  Head: Normocephalic/ Atraumatic  Eyes: Extra-occular movements intact.   Ears: No auricular hematoma or deformities  Mouth: No lesions or ulcerations  Heart: peripheral pulses intact. Normal capillary refill.   Lungs: Non-labored respirations  Abdomen: No abdominal guarding  Extremities: No lower extremity edema bilaterally   Skin: No lesions noted   Cognition: alert & oriented x 3, attentive, able to follow 2 step commands, comprehention intact, spontaneous speech intact  Psychiatric: Mood and affect appropriate      LABS:   No results found for: \"EAG\", \"A1C\"  Lab Results   Component Value Date    WBC 6.8 03/13/2024    RBC 4.24 (L) 03/13/2024    HGB 13.9 03/13/2024    HCT 39.8 03/13/2024    MCV 93.9 03/13/2024    MCH 32.8 03/13/2024    MCHC 34.9 03/13/2024    RDW 13.4 03/13/2024    .0 03/13/2024     Lab Results   Component Value Date    GLU 97 03/13/2024    BUN 16 03/13/2024    BUNCREA 17.4 03/13/2024    CREATSERUM 0.92 03/13/2024    ANIONGAP 6 03/13/2024    GFRNAA 90 06/01/2022    GFRAA 104 06/01/2022    CA 9.5 03/13/2024    OSMOCALC 293 03/13/2024    ALKPHO 68 03/13/2024    AST 24 03/13/2024    ALT 20 03/13/2024    BILT 0.4 03/13/2024    TP 7.6 03/13/2024    ALB 4.7 03/13/2024    GLOBULIN 2.9 03/13/2024      2024    K 4.0 2024     2024    CO2 28.0 2024     No results found for: \"PTP\", \"PT\", \"INR\"  No results found for: \"VITD\", \"QVITD\", \"[id number]\"                   [1]   Past Medical History:   Central serous chorioretinopathy of eye, right    Seen by Dr. Potter    Macular edema- right eye    Screen for colon cancer    repeat CLN in 10 years   [2]   Past Surgical History:  Procedure Laterality Date    Colonoscopy  2014    Other surgical history      bilateral knee- meniscus repair   [3]   Current Outpatient Medications   Medication Sig Dispense Refill    hydrocortisone 2.5 % External Cream Apply 1 Application topically 2 (two) times daily. 30 g 1    Multiple Vitamins-Minerals (CENTRUM SILVER 50+MEN OR) Take by mouth.      methylPREDNISolone (MEDROL) 4 MG Oral Tablet Therapy Pack As directed. 1 each 0   [4]   Allergies  Allergen Reactions    Penicillamine HIVES    Penicillins RASH   [5]   Family History  Problem Relation Age of Onset    Heart Disorder Mother     Heart Disorder Maternal Grandmother     Diabetes Neg     Glaucoma Neg     Macular degeneration Neg    [6]   Social History  Socioeconomic History    Marital status:    Tobacco Use    Smoking status: Some Days     Current packs/day: 0.00     Types: Cigarettes     Start date: 3/5/2019     Last attempt to quit: 3/5/2021     Years since quittin.4     Passive exposure: Never    Smokeless tobacco: Never    Tobacco comments:     Few times a week   Vaping Use    Vaping status: Never Used   Substance and Sexual Activity    Alcohol use: Yes     Alcohol/week: 12.0 standard drinks of alcohol     Types: 12 Cans of beer per week     Comment: Socially    Drug use: Never

## 2025-07-28 NOTE — PATIENT INSTRUCTIONS
-Start Medrol dose pack to be started  -Start PT and home exercises  -Follow up in 6 weeks  -If no better will consider facet joint injections

## 2025-07-28 NOTE — PROGRESS NOTES
The following individual(s) verbally consented to be recorded using ambient AI listening technology and understand that they can each withdraw their consent to this listening technology at any point by asking the clinician to turn off or pause the recording:    Patient name: Abdulkadir GARRISON Benjamín  Additional names:

## 2025-07-29 ENCOUNTER — PATIENT MESSAGE (OUTPATIENT)
Dept: FAMILY MEDICINE CLINIC | Facility: CLINIC | Age: 60
End: 2025-07-29

## 2025-07-29 DIAGNOSIS — M54.41 ACUTE BACK PAIN WITH SCIATICA, RIGHT: Primary | ICD-10-CM

## (undated) DIAGNOSIS — H53.9 VISION CHANGES: Primary | ICD-10-CM

## (undated) NOTE — LETTER
9/1/2021          To Whom It May Concern:    David Valdez is currently under my medical care and may return to work on 9/7/21. Activity is restricted as follows:  No ladder duty for 1 month as well as providing him two 10-15 min breaks throughout shift fo

## (undated) NOTE — LETTER
May 18, 2021         Burl [place]ot, [facility]      Patient: Dilia Baxter   YOB: 1965   Date of Visit: 5/18/2021       Dear Dr. Iliana Olson,    I saw your patient, Dilia Baxter, on 5/18/2021.

## (undated) NOTE — LETTER
Weiler, Colleen M, Do  [address] [facility] [address]  [place] [address]       07/06/24        Patient: Abdulkadir Butts   YOB: 1965   Date of Visit: 7/6/2024       Dear  Dr. Weiler, DO,      Thank you for referring Abdulkadir Butts to my practice.  Please find my assessment and plan below.    ASSESSMENT AND PLAN    1. Tinnitus of both ears  Asymmetric moderate hearing loss on the right at the highest frequencies mild loss on the left.  Unclear as to how long this has asymmetry but he is only been symptomatic with his imbalance and unsteadiness for about 9 days.  We did discuss the value of prednisone burst and taper and he wishes to proceed.  Return to see me in 2 weeks with repeat audiogram.  - Audiology Referral - In Network                   Sincerely,   Wesley Peña MD   [address] [id number]    Document electronically generated by:  Wesley Peña MD